# Patient Record
Sex: FEMALE | Race: OTHER
[De-identification: names, ages, dates, MRNs, and addresses within clinical notes are randomized per-mention and may not be internally consistent; named-entity substitution may affect disease eponyms.]

---

## 2017-03-06 ENCOUNTER — RECORD ABSTRACTING (OUTPATIENT)
Age: 62
End: 2017-03-06

## 2017-03-06 DIAGNOSIS — M99.81 OTHER BIOMECHANICAL LESIONS OF CERVICAL REGION: ICD-10-CM

## 2017-03-06 DIAGNOSIS — M48.00 SPINAL STENOSIS, SITE UNSPECIFIED: ICD-10-CM

## 2024-04-17 ENCOUNTER — INPATIENT (INPATIENT)
Facility: HOSPITAL | Age: 69
LOS: 1 days | Discharge: HOME | DRG: 313 | End: 2024-04-19
Attending: INTERNAL MEDICINE | Admitting: STUDENT IN AN ORGANIZED HEALTH CARE EDUCATION/TRAINING PROGRAM
Payer: SELF-PAY

## 2024-04-17 VITALS
SYSTOLIC BLOOD PRESSURE: 131 MMHG | OXYGEN SATURATION: 98 % | WEIGHT: 136.69 LBS | TEMPERATURE: 98 F | DIASTOLIC BLOOD PRESSURE: 68 MMHG | RESPIRATION RATE: 17 BRPM | HEART RATE: 81 BPM

## 2024-04-17 LAB
ALBUMIN SERPL ELPH-MCNC: 4 G/DL — SIGNIFICANT CHANGE UP (ref 3.5–5.2)
ALP SERPL-CCNC: 83 U/L — SIGNIFICANT CHANGE UP (ref 30–115)
ALT FLD-CCNC: 15 U/L — SIGNIFICANT CHANGE UP (ref 0–41)
ANION GAP SERPL CALC-SCNC: 8 MMOL/L — SIGNIFICANT CHANGE UP (ref 7–14)
APTT BLD: 29.8 SEC — SIGNIFICANT CHANGE UP (ref 27–39.2)
AST SERPL-CCNC: 18 U/L — SIGNIFICANT CHANGE UP (ref 0–41)
BASOPHILS # BLD AUTO: 0.06 K/UL — SIGNIFICANT CHANGE UP (ref 0–0.2)
BASOPHILS NFR BLD AUTO: 0.7 % — SIGNIFICANT CHANGE UP (ref 0–1)
BILIRUB SERPL-MCNC: <0.2 MG/DL — SIGNIFICANT CHANGE UP (ref 0.2–1.2)
BUN SERPL-MCNC: 12 MG/DL — SIGNIFICANT CHANGE UP (ref 10–20)
CALCIUM SERPL-MCNC: 8.9 MG/DL — SIGNIFICANT CHANGE UP (ref 8.4–10.5)
CHLORIDE SERPL-SCNC: 110 MMOL/L — SIGNIFICANT CHANGE UP (ref 98–110)
CO2 SERPL-SCNC: 25 MMOL/L — SIGNIFICANT CHANGE UP (ref 17–32)
CREAT SERPL-MCNC: 0.9 MG/DL — SIGNIFICANT CHANGE UP (ref 0.7–1.5)
D DIMER BLD IA.RAPID-MCNC: <150 NG/ML DDU — SIGNIFICANT CHANGE UP
EGFR: 70 ML/MIN/1.73M2 — SIGNIFICANT CHANGE UP
EOSINOPHIL # BLD AUTO: 0.34 K/UL — SIGNIFICANT CHANGE UP (ref 0–0.7)
EOSINOPHIL NFR BLD AUTO: 4.1 % — SIGNIFICANT CHANGE UP (ref 0–8)
GLUCOSE SERPL-MCNC: 87 MG/DL — SIGNIFICANT CHANGE UP (ref 70–99)
HCT VFR BLD CALC: 38 % — SIGNIFICANT CHANGE UP (ref 37–47)
HGB BLD-MCNC: 13.3 G/DL — SIGNIFICANT CHANGE UP (ref 12–16)
IMM GRANULOCYTES NFR BLD AUTO: 0.2 % — SIGNIFICANT CHANGE UP (ref 0.1–0.3)
INR BLD: 0.97 RATIO — SIGNIFICANT CHANGE UP (ref 0.65–1.3)
LYMPHOCYTES # BLD AUTO: 3.91 K/UL — HIGH (ref 1.2–3.4)
LYMPHOCYTES # BLD AUTO: 47.3 % — SIGNIFICANT CHANGE UP (ref 20.5–51.1)
MAGNESIUM SERPL-MCNC: 2.1 MG/DL — SIGNIFICANT CHANGE UP (ref 1.8–2.4)
MCHC RBC-ENTMCNC: 31.4 PG — HIGH (ref 27–31)
MCHC RBC-ENTMCNC: 35 G/DL — SIGNIFICANT CHANGE UP (ref 32–37)
MCV RBC AUTO: 89.6 FL — SIGNIFICANT CHANGE UP (ref 81–99)
MONOCYTES # BLD AUTO: 0.65 K/UL — HIGH (ref 0.1–0.6)
MONOCYTES NFR BLD AUTO: 7.9 % — SIGNIFICANT CHANGE UP (ref 1.7–9.3)
NEUTROPHILS # BLD AUTO: 3.28 K/UL — SIGNIFICANT CHANGE UP (ref 1.4–6.5)
NEUTROPHILS NFR BLD AUTO: 39.8 % — LOW (ref 42.2–75.2)
NRBC # BLD: 0 /100 WBCS — SIGNIFICANT CHANGE UP (ref 0–0)
NT-PROBNP SERPL-SCNC: 166 PG/ML — SIGNIFICANT CHANGE UP (ref 0–300)
PLATELET # BLD AUTO: 297 K/UL — SIGNIFICANT CHANGE UP (ref 130–400)
PMV BLD: 10.2 FL — SIGNIFICANT CHANGE UP (ref 7.4–10.4)
POTASSIUM SERPL-MCNC: 3.9 MMOL/L — SIGNIFICANT CHANGE UP (ref 3.5–5)
POTASSIUM SERPL-SCNC: 3.9 MMOL/L — SIGNIFICANT CHANGE UP (ref 3.5–5)
PROT SERPL-MCNC: 6.6 G/DL — SIGNIFICANT CHANGE UP (ref 6–8)
PROTHROM AB SERPL-ACNC: 11 SEC — SIGNIFICANT CHANGE UP (ref 9.95–12.87)
RBC # BLD: 4.24 M/UL — SIGNIFICANT CHANGE UP (ref 4.2–5.4)
RBC # FLD: 12.8 % — SIGNIFICANT CHANGE UP (ref 11.5–14.5)
SODIUM SERPL-SCNC: 143 MMOL/L — SIGNIFICANT CHANGE UP (ref 135–146)
TROPONIN T, HIGH SENSITIVITY RESULT: 17 NG/L — HIGH (ref 6–13)
TROPONIN T, HIGH SENSITIVITY RESULT: 7 NG/L — SIGNIFICANT CHANGE UP (ref 6–13)
WBC # BLD: 8.26 K/UL — SIGNIFICANT CHANGE UP (ref 4.8–10.8)
WBC # FLD AUTO: 8.26 K/UL — SIGNIFICANT CHANGE UP (ref 4.8–10.8)

## 2024-04-17 PROCEDURE — 71045 X-RAY EXAM CHEST 1 VIEW: CPT | Mod: 26

## 2024-04-17 PROCEDURE — 99285 EMERGENCY DEPT VISIT HI MDM: CPT

## 2024-04-17 PROCEDURE — 70450 CT HEAD/BRAIN W/O DYE: CPT | Mod: 26,MC

## 2024-04-17 RX ORDER — ASPIRIN/CALCIUM CARB/MAGNESIUM 324 MG
324 TABLET ORAL ONCE
Refills: 0 | Status: COMPLETED | OUTPATIENT
Start: 2024-04-17 | End: 2024-04-17

## 2024-04-17 NOTE — ED ADULT TRIAGE NOTE - CHIEF COMPLAINT QUOTE
Pt had syncopal episode today. does not recall event. complaining of chest pain and dizziness prior. EKG done in triage. hx of cardiac stents

## 2024-04-17 NOTE — ED PROVIDER NOTE - CONSIDERATION OF ADMISSION OBSERVATION
Initial plan was for EDOU for cardiac monitoring, stress test in a.m.  However, repeat troponin came back elevated at 17, patient admitted to telemetry for further cardiac evaluation. Consideration of Admission/Observation

## 2024-04-17 NOTE — ED PROVIDER NOTE - OBJECTIVE STATEMENT
68 year old female with a history of HTN, CAD with stents, HLD presents to the ED with syncope. Patient reports intermittent left sided chest pain associated with dry cough and dyspnea on exertion for the past 3 months. Today she had a syncopal episode while in her kitchen witnessed by her daughter in which she became lightheaded, lowered herself to the ground and lost consciousness for 1 minute. Patients Daughter states no head trauma or other injury and patient is not on AC. Denies fever, chills, abdominal pain, nausea, vomiting, diarrhea, constipation, dysuria, hematuria, lower extremity swelling, rash. 68 year old female with a history of HTN, CAD with 2 stents, HLD, ASD presents to the ED with syncope. Patient reports intermittent left sided chest pain associated with dry cough and dyspnea on exertion for the past 3 months. Today she had a syncopal episode while in her kitchen witnessed by her daughter in which she became lightheaded, lowered herself to the ground and lost consciousness for 1 minute. Patients Daughter states no head trauma or other injury and patient is not on AC. Denies fever, chills, abdominal pain, nausea, vomiting, diarrhea, constipation, dysuria, hematuria, lower extremity swelling, rash.

## 2024-04-17 NOTE — ED PROVIDER NOTE - PHYSICAL EXAMINATION
Physical Exam    Constitutional: No acute distress.   Eyes: Conjunctiva pink, Sclera clear, PERRLA, EOMI.  ENT: No sinus tenderness. No nasal discharge. No oropharyngeal erythema, edema, or exudates. Uvula midline.   Cardiovascular: Regular rate, regular rhythm. +murmur  Respiratory: unlabored respiratory effort, clear to auscultation bilaterally no wheezing, rales or rhonchi  Gastrointestinal: Normal bowel sounds. soft, non distended, non-tender abdomen.   Musculoskeletal: supple neck, no midline tenderness. No joint or bony deformity.   Integumentary: warm, dry, no rash  Neurologic: awake, alert, cranial nerves II-XII grossly intact, extremities’ motor and sensory functions grossly intact

## 2024-04-17 NOTE — ED PROVIDER NOTE - CLINICAL SUMMARY MEDICAL DECISION MAKING FREE TEXT BOX
68-year-old female with PMHx as noted, in ER for evaluation after episode of syncope today.  Patient has also been having intermittent L sided CP for the past few months.  Labs reviewed: CBC/CMP.  Unremarkable.  Initial troponin 7.  CT head negative.  CXR negative.  EKG: NSR @76, NAD, no acute ST-T wave changes, QTc 436.  Initial plan was for EDOU for cardiac monitoring, stress test in a.m.  However, repeat troponin came back elevated at 17, patient admitted to telemetry for further cardiac evaluation.

## 2024-04-17 NOTE — ED CDU PROVIDER INITIAL DAY NOTE - OBJECTIVE STATEMENT
68 year old female with a history of HTN, CAD with stents, HLD presents to the ED with syncope. Patient reports intermittent left sided chest pain associated with dry cough and dyspnea on exertion for the past 3 months. Today she had a syncopal episode while in her kitchen witnessed by her daughter in which she became lightheaded, lowered herself to the ground and lost consciousness for 1 minute. Patients Daughter states no head trauma or other injury and patient is not on AC. Denies fever, chills, abdominal pain, nausea, vomiting, diarrhea, constipation, dysuria, hematuria, lower extremity swelling, rash.

## 2024-04-17 NOTE — ED PROVIDER NOTE - ATTENDING APP SHARED VISIT CONTRIBUTION OF CARE
68-year-old female with history of HTN, HLD, CAD s/p stents x 2 in 2015, in ER with daughter for evaluation of CP and episode of syncope today.  Per daughter, patient was in kitchen and felt lightheaded and weak, lowered herself to the ground.  Unsure if she lost consciousness.  Denies any head trauma or headache.  Patient has been having L sided CP intermittently, also having some SOB today.  No LE pain/swelling.  No abdominal pain.  No N/V/D.  Not on any AC meds.  Patient has not followed with cardiologist since having stents placed.  PE - nad, nc/at, eomi, perrl, op - clear, mmm, no C-spine tenderness, cta b/l, no w/r/r, rrr, abd- soft, nt/nd, nabs, from x 4, no LE swelling/tenderness, A&O x 3, cn 2-12 intact, no focal motor/sensory deficits.   -Cardiac workup

## 2024-04-18 ENCOUNTER — RESULT REVIEW (OUTPATIENT)
Age: 69
End: 2024-04-18

## 2024-04-18 DIAGNOSIS — R07.9 CHEST PAIN, UNSPECIFIED: ICD-10-CM

## 2024-04-18 LAB
A1C WITH ESTIMATED AVERAGE GLUCOSE RESULT: 5.5 % — SIGNIFICANT CHANGE UP (ref 4–5.6)
ALBUMIN SERPL ELPH-MCNC: 3.9 G/DL — SIGNIFICANT CHANGE UP (ref 3.5–5.2)
ALP SERPL-CCNC: 87 U/L — SIGNIFICANT CHANGE UP (ref 30–115)
ALT FLD-CCNC: 14 U/L — SIGNIFICANT CHANGE UP (ref 0–41)
ANION GAP SERPL CALC-SCNC: 10 MMOL/L — SIGNIFICANT CHANGE UP (ref 7–14)
AST SERPL-CCNC: 17 U/L — SIGNIFICANT CHANGE UP (ref 0–41)
BASOPHILS # BLD AUTO: 0.07 K/UL — SIGNIFICANT CHANGE UP (ref 0–0.2)
BASOPHILS NFR BLD AUTO: 1 % — SIGNIFICANT CHANGE UP (ref 0–1)
BILIRUB SERPL-MCNC: 0.2 MG/DL — SIGNIFICANT CHANGE UP (ref 0.2–1.2)
BUN SERPL-MCNC: 14 MG/DL — SIGNIFICANT CHANGE UP (ref 10–20)
CALCIUM SERPL-MCNC: 9.3 MG/DL — SIGNIFICANT CHANGE UP (ref 8.4–10.5)
CHLORIDE SERPL-SCNC: 110 MMOL/L — SIGNIFICANT CHANGE UP (ref 98–110)
CHOLEST SERPL-MCNC: 225 MG/DL — HIGH
CO2 SERPL-SCNC: 26 MMOL/L — SIGNIFICANT CHANGE UP (ref 17–32)
CREAT SERPL-MCNC: 0.9 MG/DL — SIGNIFICANT CHANGE UP (ref 0.7–1.5)
EGFR: 70 ML/MIN/1.73M2 — SIGNIFICANT CHANGE UP
EOSINOPHIL # BLD AUTO: 0.25 K/UL — SIGNIFICANT CHANGE UP (ref 0–0.7)
EOSINOPHIL NFR BLD AUTO: 3.7 % — SIGNIFICANT CHANGE UP (ref 0–8)
ESTIMATED AVERAGE GLUCOSE: 111 MG/DL — SIGNIFICANT CHANGE UP (ref 68–114)
GLUCOSE SERPL-MCNC: 112 MG/DL — HIGH (ref 70–99)
HCT VFR BLD CALC: 38.8 % — SIGNIFICANT CHANGE UP (ref 37–47)
HDLC SERPL-MCNC: 50 MG/DL — LOW
HGB BLD-MCNC: 12.9 G/DL — SIGNIFICANT CHANGE UP (ref 12–16)
IMM GRANULOCYTES NFR BLD AUTO: 0.3 % — SIGNIFICANT CHANGE UP (ref 0.1–0.3)
LIPID PNL WITH DIRECT LDL SERPL: 157 MG/DL — HIGH
LYMPHOCYTES # BLD AUTO: 3.14 K/UL — SIGNIFICANT CHANGE UP (ref 1.2–3.4)
LYMPHOCYTES # BLD AUTO: 45.9 % — SIGNIFICANT CHANGE UP (ref 20.5–51.1)
MAGNESIUM SERPL-MCNC: 2.2 MG/DL — SIGNIFICANT CHANGE UP (ref 1.8–2.4)
MCHC RBC-ENTMCNC: 30.4 PG — SIGNIFICANT CHANGE UP (ref 27–31)
MCHC RBC-ENTMCNC: 33.2 G/DL — SIGNIFICANT CHANGE UP (ref 32–37)
MCV RBC AUTO: 91.5 FL — SIGNIFICANT CHANGE UP (ref 81–99)
MONOCYTES # BLD AUTO: 0.51 K/UL — SIGNIFICANT CHANGE UP (ref 0.1–0.6)
MONOCYTES NFR BLD AUTO: 7.5 % — SIGNIFICANT CHANGE UP (ref 1.7–9.3)
NEUTROPHILS # BLD AUTO: 2.85 K/UL — SIGNIFICANT CHANGE UP (ref 1.4–6.5)
NEUTROPHILS NFR BLD AUTO: 41.6 % — LOW (ref 42.2–75.2)
NON HDL CHOLESTEROL: 175 MG/DL — HIGH
NRBC # BLD: 0 /100 WBCS — SIGNIFICANT CHANGE UP (ref 0–0)
PLATELET # BLD AUTO: 276 K/UL — SIGNIFICANT CHANGE UP (ref 130–400)
PMV BLD: 10.2 FL — SIGNIFICANT CHANGE UP (ref 7.4–10.4)
POTASSIUM SERPL-MCNC: 4.4 MMOL/L — SIGNIFICANT CHANGE UP (ref 3.5–5)
POTASSIUM SERPL-SCNC: 4.4 MMOL/L — SIGNIFICANT CHANGE UP (ref 3.5–5)
PROT SERPL-MCNC: 6.7 G/DL — SIGNIFICANT CHANGE UP (ref 6–8)
RBC # BLD: 4.24 M/UL — SIGNIFICANT CHANGE UP (ref 4.2–5.4)
RBC # FLD: 13 % — SIGNIFICANT CHANGE UP (ref 11.5–14.5)
SODIUM SERPL-SCNC: 146 MMOL/L — SIGNIFICANT CHANGE UP (ref 135–146)
TRIGL SERPL-MCNC: 86 MG/DL — SIGNIFICANT CHANGE UP
TROPONIN T, HIGH SENSITIVITY RESULT: <6 NG/L — SIGNIFICANT CHANGE UP (ref 6–13)
TSH SERPL-MCNC: 1.73 UIU/ML — SIGNIFICANT CHANGE UP (ref 0.27–4.2)
WBC # BLD: 6.84 K/UL — SIGNIFICANT CHANGE UP (ref 4.8–10.8)
WBC # FLD AUTO: 6.84 K/UL — SIGNIFICANT CHANGE UP (ref 4.8–10.8)

## 2024-04-18 PROCEDURE — 93010 ELECTROCARDIOGRAM REPORT: CPT | Mod: 77

## 2024-04-18 PROCEDURE — 93005 ELECTROCARDIOGRAM TRACING: CPT

## 2024-04-18 PROCEDURE — 36415 COLL VENOUS BLD VENIPUNCTURE: CPT

## 2024-04-18 PROCEDURE — 93306 TTE W/DOPPLER COMPLETE: CPT | Mod: 26

## 2024-04-18 PROCEDURE — 93458 L HRT ARTERY/VENTRICLE ANGIO: CPT | Mod: 59

## 2024-04-18 PROCEDURE — 99222 1ST HOSP IP/OBS MODERATE 55: CPT | Mod: 57

## 2024-04-18 PROCEDURE — 99254 IP/OBS CNSLTJ NEW/EST MOD 60: CPT

## 2024-04-18 PROCEDURE — 80061 LIPID PANEL: CPT

## 2024-04-18 PROCEDURE — C9600: CPT | Mod: LC

## 2024-04-18 PROCEDURE — 92928 PRQ TCAT PLMT NTRAC ST 1 LES: CPT | Mod: LC

## 2024-04-18 PROCEDURE — 99222 1ST HOSP IP/OBS MODERATE 55: CPT

## 2024-04-18 PROCEDURE — C1725: CPT

## 2024-04-18 PROCEDURE — 93010 ELECTROCARDIOGRAM REPORT: CPT

## 2024-04-18 PROCEDURE — 84100 ASSAY OF PHOSPHORUS: CPT

## 2024-04-18 PROCEDURE — 83735 ASSAY OF MAGNESIUM: CPT

## 2024-04-18 PROCEDURE — 83036 HEMOGLOBIN GLYCOSYLATED A1C: CPT

## 2024-04-18 PROCEDURE — 86803 HEPATITIS C AB TEST: CPT

## 2024-04-18 PROCEDURE — 93458 L HRT ARTERY/VENTRICLE ANGIO: CPT | Mod: 26,XU

## 2024-04-18 PROCEDURE — 84443 ASSAY THYROID STIM HORMONE: CPT

## 2024-04-18 PROCEDURE — C1874: CPT

## 2024-04-18 PROCEDURE — C1769: CPT

## 2024-04-18 PROCEDURE — C1894: CPT

## 2024-04-18 PROCEDURE — 84484 ASSAY OF TROPONIN QUANT: CPT

## 2024-04-18 PROCEDURE — C1887: CPT

## 2024-04-18 PROCEDURE — 80053 COMPREHEN METABOLIC PANEL: CPT

## 2024-04-18 PROCEDURE — 93306 TTE W/DOPPLER COMPLETE: CPT

## 2024-04-18 PROCEDURE — 85025 COMPLETE CBC W/AUTO DIFF WBC: CPT

## 2024-04-18 RX ORDER — CLOPIDOGREL BISULFATE 75 MG/1
75 TABLET, FILM COATED ORAL DAILY
Refills: 0 | Status: DISCONTINUED | OUTPATIENT
Start: 2024-04-18 | End: 2024-04-19

## 2024-04-18 RX ORDER — ONDANSETRON 8 MG/1
4 TABLET, FILM COATED ORAL EVERY 8 HOURS
Refills: 0 | Status: DISCONTINUED | OUTPATIENT
Start: 2024-04-18 | End: 2024-04-19

## 2024-04-18 RX ORDER — SODIUM CHLORIDE 9 MG/ML
250 INJECTION INTRAMUSCULAR; INTRAVENOUS; SUBCUTANEOUS ONCE
Refills: 0 | Status: COMPLETED | OUTPATIENT
Start: 2024-04-18 | End: 2024-04-18

## 2024-04-18 RX ORDER — INFLUENZA VIRUS VACCINE 15; 15; 15; 15 UG/.5ML; UG/.5ML; UG/.5ML; UG/.5ML
0.7 SUSPENSION INTRAMUSCULAR ONCE
Refills: 0 | Status: DISCONTINUED | OUTPATIENT
Start: 2024-04-18 | End: 2024-04-19

## 2024-04-18 RX ORDER — ASPIRIN/CALCIUM CARB/MAGNESIUM 324 MG
81 TABLET ORAL DAILY
Refills: 0 | Status: DISCONTINUED | OUTPATIENT
Start: 2024-04-19 | End: 2024-04-19

## 2024-04-18 RX ORDER — PANTOPRAZOLE SODIUM 20 MG/1
40 TABLET, DELAYED RELEASE ORAL
Refills: 0 | Status: DISCONTINUED | OUTPATIENT
Start: 2024-04-18 | End: 2024-04-19

## 2024-04-18 RX ORDER — AMLODIPINE BESYLATE 2.5 MG/1
2.5 TABLET ORAL ONCE
Refills: 0 | Status: COMPLETED | OUTPATIENT
Start: 2024-04-18 | End: 2024-04-18

## 2024-04-18 RX ORDER — ENOXAPARIN SODIUM 100 MG/ML
40 INJECTION SUBCUTANEOUS EVERY 24 HOURS
Refills: 0 | Status: DISCONTINUED | OUTPATIENT
Start: 2024-04-18 | End: 2024-04-19

## 2024-04-18 RX ORDER — ATORVASTATIN CALCIUM 80 MG/1
80 TABLET, FILM COATED ORAL AT BEDTIME
Refills: 0 | Status: DISCONTINUED | OUTPATIENT
Start: 2024-04-18 | End: 2024-04-19

## 2024-04-18 RX ORDER — METOPROLOL TARTRATE 50 MG
25 TABLET ORAL
Refills: 0 | Status: DISCONTINUED | OUTPATIENT
Start: 2024-04-18 | End: 2024-04-19

## 2024-04-18 RX ORDER — LANOLIN ALCOHOL/MO/W.PET/CERES
3 CREAM (GRAM) TOPICAL AT BEDTIME
Refills: 0 | Status: DISCONTINUED | OUTPATIENT
Start: 2024-04-18 | End: 2024-04-19

## 2024-04-18 RX ORDER — ACETAMINOPHEN 500 MG
650 TABLET ORAL EVERY 6 HOURS
Refills: 0 | Status: DISCONTINUED | OUTPATIENT
Start: 2024-04-18 | End: 2024-04-19

## 2024-04-18 RX ORDER — SODIUM CHLORIDE 9 MG/ML
1000 INJECTION, SOLUTION INTRAVENOUS
Refills: 0 | Status: DISCONTINUED | OUTPATIENT
Start: 2024-04-18 | End: 2024-04-18

## 2024-04-18 RX ORDER — SODIUM CHLORIDE 9 MG/ML
1000 INJECTION INTRAMUSCULAR; INTRAVENOUS; SUBCUTANEOUS
Refills: 0 | Status: DISCONTINUED | OUTPATIENT
Start: 2024-04-18 | End: 2024-04-19

## 2024-04-18 RX ADMIN — CLOPIDOGREL BISULFATE 75 MILLIGRAM(S): 75 TABLET, FILM COATED ORAL at 08:22

## 2024-04-18 RX ADMIN — SODIUM CHLORIDE 100 MILLILITER(S): 9 INJECTION INTRAMUSCULAR; INTRAVENOUS; SUBCUTANEOUS at 11:39

## 2024-04-18 RX ADMIN — Medication 25 MILLIGRAM(S): at 18:30

## 2024-04-18 RX ADMIN — SODIUM CHLORIDE 250 MILLILITER(S): 9 INJECTION INTRAMUSCULAR; INTRAVENOUS; SUBCUTANEOUS at 08:53

## 2024-04-18 RX ADMIN — Medication 3 MILLIGRAM(S): at 22:00

## 2024-04-18 RX ADMIN — ATORVASTATIN CALCIUM 80 MILLIGRAM(S): 80 TABLET, FILM COATED ORAL at 22:00

## 2024-04-18 RX ADMIN — AMLODIPINE BESYLATE 2.5 MILLIGRAM(S): 2.5 TABLET ORAL at 08:55

## 2024-04-18 RX ADMIN — Medication 324 MILLIGRAM(S): at 00:40

## 2024-04-18 RX ADMIN — ENOXAPARIN SODIUM 40 MILLIGRAM(S): 100 INJECTION SUBCUTANEOUS at 22:01

## 2024-04-18 NOTE — PATIENT PROFILE ADULT - LANGUAGE ASSISTANCE NEEDED
-- Message is from the [facility]--    Reason for Call: Patient returning a call from the office.    Caller Information       Type Contact Phone    04/27/2019 10:32 AM Phone (Incoming) Teetee Laurent (Self) [phone] (H)          Alternative phone number: na    Turnaround time given to caller:   \"This message will be sent to [state Provider's name]. The clinical team will fulfill your request as soon as they review your message.\"     Yes-Patient/Caregiver accepts free interpretation services...

## 2024-04-18 NOTE — H&P ADULT - NSHPLABSRESULTS_GEN_ALL_CORE
12.9   6.84  )-----------( 276      ( 18 Apr 2024 07:22 )             38.8       04-18    146  |  110  |  14  ----------------------------<  112<H>  4.4   |  26  |  0.9    Ca    9.3      18 Apr 2024 07:22  Mg     2.2     04-18    TPro  6.7  /  Alb  3.9  /  TBili  0.2  /  DBili  x   /  AST  17  /  ALT  14  /  AlkPhos  87  04-18              Urinalysis Basic - ( 18 Apr 2024 07:22 )    Color: x / Appearance: x / SG: x / pH: x  Gluc: 112 mg/dL / Ketone: x  / Bili: x / Urobili: x   Blood: x / Protein: x / Nitrite: x   Leuk Esterase: x / RBC: x / WBC x   Sq Epi: x / Non Sq Epi: x / Bacteria: x        PT/INR - ( 17 Apr 2024 18:30 )   PT: 11.00 sec;   INR: 0.97 ratio         PTT - ( 17 Apr 2024 18:30 )  PTT:29.8 sec    Lactate Trend            CAPILLARY BLOOD GLUCOSE

## 2024-04-18 NOTE — CONSULT NOTE ADULT - SUBJECTIVE AND OBJECTIVE BOX
Date of Admission: 04-18-24    HISTORY OF PRESENT ILLNESS:    67 YO F with HTN, CAD s/p PCI to LAD in 2016, HLD, ASD s/p repair presented to the ED with syncope.   She reports that she felt dizzy and passed out. Denies any head trauma. Family brought her to the ED. She denies any chest pain or palpitations before passing out.   She also reports experiencing squeezing chest pressure with radiation to the neck. She her limited her physical activity. Chest pressure happens at rest and with exertion. Pain is not reproducible.   Denies fever, chills, abdominal pain, nausea, vomiting, diarrhea, constipation, dysuria, hematuria, lower extremity swelling, rash.  In the ED VS were WNL. Labs were significant for troponin 7 -> 17. No acute ischemic changes on EKG      PAST MEDICAL & SURGICAL HISTORY  Essential hypertension    Hyperlipemia    Peptic ulcer disease    ASD (atrial septal defect)    No significant past surgical history        FAMILY HISTORY:  Father: MI  Mother: Stroke  Sister: CABG at 52    SOCIAL HISTORY:   [X] Non-smoker    REVIEW OF SYMPTOMS:  CONSTITUTIONAL: No fevers or chills.  EYES: No visual changes, eye pain, or discharge  ENT: No vertigo; No ear pain or change in hearing; No sore throat or difficulty swallowing  NECK: No pain or stiffness  RESPIRATORY: Shortness of breath  CARDIOVASCULAR: Chest pain  GASTROINTESTINAL: No abdominal or epigastric pain; No nausea, vomiting, or hematemesis; No diarrhea or constipation; No melena or hematochezia  GENITOURINARY: No dysuria, frequency or hematuria  MUSCULOSKELETAL: No joint pain, no muscle pain  NEUROLOGICAL: No numbness or weakness  SKIN: No itching or rashes    VITALS:  T(C): 36.6 (04-17-24 @ 23:59), Max: 36.6 (04-17-24 @ 15:30)  HR: 70 (04-17-24 @ 23:59) (70 - 81)  BP: 109/58 (04-17-24 @ 23:59) (109/58 - 131/68)  RR: 17 (04-17-24 @ 23:59) (17 - 17)  SpO2: 99% (04-17-24 @ 23:59) (98% - 99%)  Wt(kg): --  Daily     Daily     PHYSICAL EXAM:  GEN: Not in acute distress  HEENT: EOMI  LUNGS: Dec BS   CARDIOVASCULAR: RRR, S1/S2 present  ABD: Soft, non-tender, non-distended  EXT: No FRANCISCO  SKIN: Warm  NEURO: AAOx3    LABS:	 	                        13.3   8.26  )-----------( 297      ( 17 Apr 2024 16:40 )             38.0     04-17    143  |  110  |  12  ----------------------------<  87  3.9   |  25  |  0.9    Ca    8.9      17 Apr 2024 16:40  Mg     2.1     04-17    TPro  6.6  /  Alb  4.0  /  TBili  <0.2  /  DBili  x   /  AST  18  /  ALT  15  /  AlkPhos  83  04-17        PT/INR - ( 17 Apr 2024 18:30 )   PT: 11.00 sec;   INR: 0.97 ratio         PTT - ( 17 Apr 2024 18:30 )  PTT:29.8 sec    Intake and Output:      CARDIAC MARKERS:  Troponin T, High Sensitivity Result: 17 ng/L (04-17-24 @ 20:41)  Troponin T, High Sensitivity Result: 7 ng/L (04-17-24 @ 16:40)      TELEMETRY EVENTS:    ECG:    RADIOLOGY:    OTHER:    Echocardiogram:    Catheterization:    Stress Test: 	    Home Medications:  acetaminophen 325 mg oral tablet: 2 tab(s) orally every 6 hours, As needed, Mild Pain (22 Dec 2015 15:54)  aspirin 81 mg oral delayed release tablet: 1 tab(s) orally once a day (22 Dec 2015 12:07)  clopidogrel 75 mg oral tablet: 1 tab(s) orally once a day (22 Dec 2015 12:07)  metoprolol: 12.5   every 12 hours (22 Dec 2015 12:07)  simvastatin 20 mg oral tablet: 1 tab(s) orally once a day (at bedtime) (22 Dec 2015 12:07)    MEDICATIONS  (STANDING):    MEDICATIONS  (PRN):         Date of Admission: 04-18-24      HISTORY OF PRESENT ILLNESS:      67 YO F with HTN, CAD s/p PCI to LAD in 2016, HLD, ASD s/p repair presented to the ED with syncope.   She reports that she felt dizzy and passed out. Denies any head trauma. Family brought her to the ED. She denies any chest pain or palpitations before passing out.   She also reports experiencing squeezing chest pressure with radiation to the neck. She her limited her physical activity. Chest pressure happens at rest and with exertion. Pain is not reproducible.   Denies fever, chills, abdominal pain, nausea, vomiting, diarrhea, constipation, dysuria, hematuria, lower extremity swelling, rash.  In the ED VS were WNL. Labs were significant for troponin 7 -> 17. No acute ischemic changes on EKG      PAST MEDICAL & SURGICAL HISTORY  Essential hypertension    Hyperlipemia    Peptic ulcer disease    ASD (atrial septal defect)    No significant past surgical history        FAMILY HISTORY:  Father: MI  Mother: Stroke  Sister: CABG at 52    SOCIAL HISTORY:   [X] Non-smoker    REVIEW OF SYMPTOMS:  CONSTITUTIONAL: No fevers or chills.  EYES: No visual changes, eye pain, or discharge  ENT: No vertigo; No ear pain or change in hearing; No sore throat or difficulty swallowing  NECK: No pain or stiffness  RESPIRATORY: Shortness of breath  CARDIOVASCULAR: Chest pain  GASTROINTESTINAL: No abdominal or epigastric pain; No nausea, vomiting, or hematemesis; No diarrhea or constipation; No melena or hematochezia  GENITOURINARY: No dysuria, frequency or hematuria  MUSCULOSKELETAL: No joint pain, no muscle pain  NEUROLOGICAL: No numbness or weakness  SKIN: No itching or rashes    VITALS:  T(C): 36.6 (04-17-24 @ 23:59), Max: 36.6 (04-17-24 @ 15:30)  HR: 70 (04-17-24 @ 23:59) (70 - 81)  BP: 109/58 (04-17-24 @ 23:59) (109/58 - 131/68)  RR: 17 (04-17-24 @ 23:59) (17 - 17)  SpO2: 99% (04-17-24 @ 23:59) (98% - 99%)  Wt(kg): --  Daily     Daily     PHYSICAL EXAM:  GEN: Not in acute distress  HEENT: EOMI  LUNGS: Dec BS   CARDIOVASCULAR: RRR, S1/S2 present  ABD: Soft, non-tender, non-distended  EXT: No FRANCISCO  SKIN: Warm  NEURO: AAOx3    LABS:	 	                        13.3   8.26  )-----------( 297      ( 17 Apr 2024 16:40 )             38.0     04-17    143  |  110  |  12  ----------------------------<  87  3.9   |  25  |  0.9    Ca    8.9      17 Apr 2024 16:40  Mg     2.1     04-17    TPro  6.6  /  Alb  4.0  /  TBili  <0.2  /  DBili  x   /  AST  18  /  ALT  15  /  AlkPhos  83  04-17        PT/INR - ( 17 Apr 2024 18:30 )   PT: 11.00 sec;   INR: 0.97 ratio         PTT - ( 17 Apr 2024 18:30 )  PTT:29.8 sec    Intake and Output:      CARDIAC MARKERS:  Troponin T, High Sensitivity Result: 17 ng/L (04-17-24 @ 20:41)  Troponin T, High Sensitivity Result: 7 ng/L (04-17-24 @ 16:40)      TELEMETRY EVENTS:    ECG:    RADIOLOGY:    OTHER:    Echocardiogram:    Catheterization:    Stress Test: 	    Home Medications:  acetaminophen 325 mg oral tablet: 2 tab(s) orally every 6 hours, As needed, Mild Pain (22 Dec 2015 15:54)  aspirin 81 mg oral delayed release tablet: 1 tab(s) orally once a day (22 Dec 2015 12:07)  clopidogrel 75 mg oral tablet: 1 tab(s) orally once a day (22 Dec 2015 12:07)  metoprolol: 12.5   every 12 hours (22 Dec 2015 12:07)  simvastatin 20 mg oral tablet: 1 tab(s) orally once a day (at bedtime) (22 Dec 2015 12:07)    MEDICATIONS  (STANDING):    MEDICATIONS  (PRN):

## 2024-04-18 NOTE — PATIENT PROFILE ADULT - FALL HARM RISK - HARM RISK INTERVENTIONS

## 2024-04-18 NOTE — CONSULT NOTE ADULT - ATTENDING COMMENTS
Patient well knonw to me form priro admissions and outpatient f/u. History of 2 vessel CAD, s/p PCI of proximal LAD with WERO in 2016, medical therapy for LCX, s/p ASD closure with Amplatzer device in [place]. Presenting with chest discomfort, syncope.    Plan:    C/w ASA/Plavix, statin  Troponin trending up, delta >5  Options for ischemic w/u discussed. Will proceed with angiogram for definitive diagnosis  IV hydration  Keep on tele to r/o arrhythmia  2D echo to assess the LV function, placement of the IAS device.  Further recommendations post cath

## 2024-04-18 NOTE — H&P ADULT - NSHPPHYSICALEXAM_GEN_ALL_CORE
LOS:     VITALS:   T(C): 36.2 (04-18-24 @ 08:20), Max: 36.6 (04-17-24 @ 15:30)  HR: 73 (04-18-24 @ 08:20) (67 - 81)  BP: 133/68 (04-18-24 @ 08:20) (109/58 - 133/68)  RR: 16 (04-18-24 @ 08:20) (16 - 18)  SpO2: 100% (04-18-24 @ 08:20) (98% - 100%)    GENERAL: NAD, lying in bed comfortably  NECK: Supple, No JVD  CHEST/LUNG: Clear to auscultation bilaterally; No rales, rhonchi, wheezing, or rubs. Unlabored respirations  HEART: Regular rate and rhythm; No murmurs, rubs, or gallops  ABDOMEN: BSx4; Soft, nontender, nondistended  EXTREMITIES:  2+ Peripheral Pulses, brisk capillary refill. No clubbing, cyanosis, or edema  NERVOUS SYSTEM:  A&Ox3, no focal deficits   SKIN: No rashes or lesions

## 2024-04-18 NOTE — CONSULT NOTE ADULT - SUBJECTIVE AND OBJECTIVE BOX
Date of Admission: 04-18-24    CARDIAC HISTORY OF PRESENT ILLNESS:    67 YO F with HTN, CAD (s/p 2 stents by Dr. Nieto in 2015), HLD, ASD (as per the patient s/p repair) presented to the ED with syncope.   She reports that she felt dizzy and passed out. Denies any head trauma. Family brought her to the ED. She denies any chest pain or palpitations before passing out.   She also reports experiencing squeezing chest pressure with radiation to the neck. She her limited her physical activity. Chest pressure happens at rest and with exertion. Pain is not reproducible.   Denies fever, chills, abdominal pain, nausea, vomiting, diarrhea, constipation, dysuria, hematuria, lower extremity swelling, rash.  In the ED VS were WNL. Labs were significant for troponin 7 -> 17. No acute ischemic changes on EKG    PAST MEDICAL & SURGICAL HISTORY  Essential hypertension    Hyperlipemia    Peptic ulcer disease    ASD (atrial septal defect)    No significant past surgical history        FAMILY HISTORY:  Father: MI  Mother: Stroke  Sister: CABG at 52      SOCIAL HISTORY:   [X] Non-smoker    REVIEW OF SYMPTOMS:  CONSTITUTIONAL: No fevers or chills.  EYES: No visual changes, eye pain, or discharge  ENT: No vertigo; No ear pain or change in hearing; No sore throat or difficulty swallowing  NECK: No pain or stiffness  RESPIRATORY: Shortness of breath  CARDIOVASCULAR: Chest pain  GASTROINTESTINAL: No abdominal or epigastric pain; No nausea, vomiting, or hematemesis; No diarrhea or constipation; No melena or hematochezia  GENITOURINARY: No dysuria, frequency or hematuria  MUSCULOSKELETAL: No joint pain, no muscle pain  NEUROLOGICAL: No numbness or weakness  SKIN: No itching or rashes    VITALS:  T(C): 36.6 (04-17-24 @ 23:59), Max: 36.6 (04-17-24 @ 15:30)  HR: 70 (04-17-24 @ 23:59) (70 - 81)  BP: 109/58 (04-17-24 @ 23:59) (109/58 - 131/68)  RR: 17 (04-17-24 @ 23:59) (17 - 17)  SpO2: 99% (04-17-24 @ 23:59) (98% - 99%)  Wt(kg): --  Daily     Daily     PHYSICAL EXAM:  GEN: Not in acute distress  HEENT: EOMI  LUNGS: Dec BS   CARDIOVASCULAR: RRR, S1/S2 present  ABD: Soft, non-tender, non-distended  EXT: No FRANCISCO  SKIN: Warm  NEURO: AAOx3    LABS:	 	                        13.3   8.26  )-----------( 297      ( 17 Apr 2024 16:40 )             38.0     04-17    143  |  110  |  12  ----------------------------<  87  3.9   |  25  |  0.9    Ca    8.9      17 Apr 2024 16:40  Mg     2.1     04-17    TPro  6.6  /  Alb  4.0  /  TBili  <0.2  /  DBili  x   /  AST  18  /  ALT  15  /  AlkPhos  83  04-17        PT/INR - ( 17 Apr 2024 18:30 )   PT: 11.00 sec;   INR: 0.97 ratio         PTT - ( 17 Apr 2024 18:30 )  PTT:29.8 sec    Intake and Output:      CARDIAC MARKERS:  Troponin T, High Sensitivity Result: 17 ng/L (04-17-24 @ 20:41)  Troponin T, High Sensitivity Result: 7 ng/L (04-17-24 @ 16:40)      TELEMETRY EVENTS:    ECG:    RADIOLOGY:    OTHER:    Echocardiogram:    Catheterization:    Stress Test: 	    Home Medications:  acetaminophen 325 mg oral tablet: 2 tab(s) orally every 6 hours, As needed, Mild Pain (22 Dec 2015 15:54)  aspirin 81 mg oral delayed release tablet: 1 tab(s) orally once a day (22 Dec 2015 12:07)  clopidogrel 75 mg oral tablet: 1 tab(s) orally once a day (22 Dec 2015 12:07)  metoprolol: 12.5   every 12 hours (22 Dec 2015 12:07)  simvastatin 20 mg oral tablet: 1 tab(s) orally once a day (at bedtime) (22 Dec 2015 12:07)    MEDICATIONS  (STANDING):    MEDICATIONS  (PRN):         Date of Admission: 04-18-24    CARDIAC HISTORY OF PRESENT ILLNESS:    69 YO F with HTN, CAD s/p PCI to LAD in 2016, HLD, ASD s/p repair presented to the ED with syncope.   She reports that she felt dizzy and passed out. Denies any head trauma. Family brought her to the ED. She denies any chest pain or palpitations before passing out.   She also reports experiencing squeezing chest pressure with radiation to the neck. She her limited her physical activity. Chest pressure happens at rest and with exertion. Pain is not reproducible.   Denies fever, chills, abdominal pain, nausea, vomiting, diarrhea, constipation, dysuria, hematuria, lower extremity swelling, rash.  In the ED VS were WNL. Labs were significant for troponin 7 -> 17. No acute ischemic changes on EKG    PAST MEDICAL & SURGICAL HISTORY  Essential hypertension    Hyperlipemia    Peptic ulcer disease    ASD (atrial septal defect)    No significant past surgical history        FAMILY HISTORY:  Father: MI  Mother: Stroke  Sister: CABG at 52      SOCIAL HISTORY:   [X] Non-smoker    REVIEW OF SYMPTOMS:  CONSTITUTIONAL: No fevers or chills.  EYES: No visual changes, eye pain, or discharge  ENT: No vertigo; No ear pain or change in hearing; No sore throat or difficulty swallowing  NECK: No pain or stiffness  RESPIRATORY: Shortness of breath  CARDIOVASCULAR: Chest pain  GASTROINTESTINAL: No abdominal or epigastric pain; No nausea, vomiting, or hematemesis; No diarrhea or constipation; No melena or hematochezia  GENITOURINARY: No dysuria, frequency or hematuria  MUSCULOSKELETAL: No joint pain, no muscle pain  NEUROLOGICAL: No numbness or weakness  SKIN: No itching or rashes    VITALS:  T(C): 36.6 (04-17-24 @ 23:59), Max: 36.6 (04-17-24 @ 15:30)  HR: 70 (04-17-24 @ 23:59) (70 - 81)  BP: 109/58 (04-17-24 @ 23:59) (109/58 - 131/68)  RR: 17 (04-17-24 @ 23:59) (17 - 17)  SpO2: 99% (04-17-24 @ 23:59) (98% - 99%)  Wt(kg): --  Daily     Daily     PHYSICAL EXAM:  GEN: Not in acute distress  HEENT: EOMI  LUNGS: Dec BS   CARDIOVASCULAR: RRR, S1/S2 present  ABD: Soft, non-tender, non-distended  EXT: No FRANCISCO  SKIN: Warm  NEURO: AAOx3    LABS:	 	                        13.3   8.26  )-----------( 297      ( 17 Apr 2024 16:40 )             38.0     04-17    143  |  110  |  12  ----------------------------<  87  3.9   |  25  |  0.9    Ca    8.9      17 Apr 2024 16:40  Mg     2.1     04-17    TPro  6.6  /  Alb  4.0  /  TBili  <0.2  /  DBili  x   /  AST  18  /  ALT  15  /  AlkPhos  83  04-17        PT/INR - ( 17 Apr 2024 18:30 )   PT: 11.00 sec;   INR: 0.97 ratio         PTT - ( 17 Apr 2024 18:30 )  PTT:29.8 sec    Intake and Output:      CARDIAC MARKERS:  Troponin T, High Sensitivity Result: 17 ng/L (04-17-24 @ 20:41)  Troponin T, High Sensitivity Result: 7 ng/L (04-17-24 @ 16:40)      TELEMETRY EVENTS:    ECG:    RADIOLOGY:    OTHER:    Echocardiogram:    Catheterization:    Stress Test: 	    Home Medications:  acetaminophen 325 mg oral tablet: 2 tab(s) orally every 6 hours, As needed, Mild Pain (22 Dec 2015 15:54)  aspirin 81 mg oral delayed release tablet: 1 tab(s) orally once a day (22 Dec 2015 12:07)  clopidogrel 75 mg oral tablet: 1 tab(s) orally once a day (22 Dec 2015 12:07)  metoprolol: 12.5   every 12 hours (22 Dec 2015 12:07)  simvastatin 20 mg oral tablet: 1 tab(s) orally once a day (at bedtime) (22 Dec 2015 12:07)    MEDICATIONS  (STANDING):    MEDICATIONS  (PRN):         Date of Admission: 04-18-24    CARDIAC HISTORY OF PRESENT ILLNESS:    67 YO F with HTN, CAD s/p PCI to LAD in 2016, HLD, ASD s/p repair presented to the ED with syncope.   She reports that she felt dizzy and passed out. Denies any head trauma. Family brought her to the ED. She denies any chest pain or palpitations before passing out.   She also reports experiencing squeezing chest pressure with radiation to the neck. She her limited her physical activity. Chest pressure happens at rest and with exertion. Pain is not reproducible.   Denies fever, chills, abdominal pain, nausea, vomiting, diarrhea, constipation, dysuria, hematuria, lower extremity swelling, rash.  In the ED VS were WNL. Labs were significant for troponin 7 -> 17. No acute ischemic changes on EKG    PAST MEDICAL & SURGICAL HISTORY  Essential hypertension    Hyperlipemia    Peptic ulcer disease    ASD (atrial septal defect)    No significant past surgical history        FAMILY HISTORY:  Father: MI  Mother: Stroke  Sister: CABG at 52      SOCIAL HISTORY:   [X] Non-smoker    REVIEW OF SYMPTOMS:  CONSTITUTIONAL: No fevers or chills.  EYES: No visual changes, eye pain, or discharge  ENT: No vertigo; No ear pain or change in hearing; No sore throat or difficulty swallowing  NECK: No pain or stiffness  RESPIRATORY: Shortness of breath  CARDIOVASCULAR: Chest pain  GASTROINTESTINAL: No abdominal or epigastric pain; No nausea, vomiting, or hematemesis; No diarrhea or constipation; No melena or hematochezia  GENITOURINARY: No dysuria, frequency or hematuria  MUSCULOSKELETAL: No joint pain, no muscle pain  NEUROLOGICAL: No numbness or weakness  SKIN: No itching or rashes  10 point ROS completed; negative except as stated in HPI    VITALS:  T(C): 36.6 (04-17-24 @ 23:59), Max: 36.6 (04-17-24 @ 15:30)  HR: 70 (04-17-24 @ 23:59) (70 - 81)  BP: 109/58 (04-17-24 @ 23:59) (109/58 - 131/68)  RR: 17 (04-17-24 @ 23:59) (17 - 17)  SpO2: 99% (04-17-24 @ 23:59) (98% - 99%)  Wt(kg): --  Daily     Daily     PHYSICAL EXAM:  GEN: Not in acute distress, pleasant  HEENT: EOMI, PERRLA  LUNGS: CTAB, no crackles  CARDIOVASCULAR: RRR, S1/S2 present  ABD: Soft, non-tender, non-distended  EXT: No FRANCISCO, warm  SKIN: intact, no rash  NEURO: AAOx3, no focal deficits    LABS:	 	                        13.3   8.26  )-----------( 297      ( 17 Apr 2024 16:40 )             38.0     04-17    143  |  110  |  12  ----------------------------<  87  3.9   |  25  |  0.9    Ca    8.9      17 Apr 2024 16:40  Mg     2.1     04-17    TPro  6.6  /  Alb  4.0  /  TBili  <0.2  /  DBili  x   /  AST  18  /  ALT  15  /  AlkPhos  83  04-17        PT/INR - ( 17 Apr 2024 18:30 )   PT: 11.00 sec;   INR: 0.97 ratio         PTT - ( 17 Apr 2024 18:30 )  PTT:29.8 sec    Intake and Output:      CARDIAC MARKERS:  Troponin T, High Sensitivity Result: 17 ng/L (04-17-24 @ 20:41)  Troponin T, High Sensitivity Result: 7 ng/L (04-17-24 @ 16:40)      TELEMETRY EVENTS:    ECG:    RADIOLOGY:    OTHER:    Echocardiogram:    Catheterization:    Stress Test: 	    Home Medications:  acetaminophen 325 mg oral tablet: 2 tab(s) orally every 6 hours, As needed, Mild Pain (22 Dec 2015 15:54)  aspirin 81 mg oral delayed release tablet: 1 tab(s) orally once a day (22 Dec 2015 12:07)  clopidogrel 75 mg oral tablet: 1 tab(s) orally once a day (22 Dec 2015 12:07)  metoprolol: 12.5   every 12 hours (22 Dec 2015 12:07)  simvastatin 20 mg oral tablet: 1 tab(s) orally once a day (at bedtime) (22 Dec 2015 12:07)    MEDICATIONS  (STANDING):    MEDICATIONS  (PRN):

## 2024-04-18 NOTE — CHART NOTE - NSCHARTNOTEFT_GEN_A_CORE
PREOPERATIVE DAY OF PROCEDURE EVALUATION:  I have personally seen and examined the patient.  I agree with the history and physical which I have reviewed and noted any changes below.     69 YO [place]iss[place] speaking F with HTN, HLD, CAD, s/p PCI/WERO pLAD in 05/2016, ASD s/p repair, who presented to the ED with syncopal episode.  Pt also reports worsening chest burning sensation both on exertion and at rest, trop 7->17.  Pt is now referred for [facility] with possible intervention if clinically indicated.       Bleeding Risk Score:    1.3%  IVF pre-hydration: 250cc NS bolus   -on daily baby ASA and Plavix, received both this am  -on BB, statin    Right Matthew: positive  VS: 133/68, 73, 16, 98% on RA       (Signed electronically by __________)  04-18-24 @ 08:41 PREOPERATIVE DAY OF PROCEDURE EVALUATION:  I have personally seen and examined the patient.  I agree with the history and physical which I have reviewed and noted any changes below.     67 YO Chinese speaking F with HTN, HLD, CAD, s/p PCI/WERO pLAD in 05/2016, ASD s/p repair, who presented to the ED with syncopal episode.  Pt also reports worsening chest burning sensation, both on exertion and at rest, trop 7->17.  Pt is now referred for [facility] [place] with possible intervention if clinically indicated.       Bleeding Risk Score:    1.3%  IVF pre-hydration: 250cc NS bolus   -on daily baby ASA and Plavix, received both this am  -on BB, statin  -Amlodipine 2.5mg po x 1    Right Matthew: positive  VS: 133/68, 73, 16, 98% on RA       (Signed electronically by __________)  04-18-24 @ 08:41

## 2024-04-18 NOTE — CONSULT NOTE ADULT - ASSESSMENT
67 YO F with HTN, CAD (s/p 2 stents by Dr. Nieto in 2015), HLD, ASD (as per the patient s/p repair) presented to the ED with syncope. Also reports worsening chest pressure.    IMPRESSION  #Chest pain; likely cardiac  #CAD s/p PCI  - HS Troponin trend: 7 (17 Apr 2024 16:40), 17 (17 Apr 2024 20:41)  - No acute ischemic changes on EKG  - Takes plavix 75mg PO daily and rosuvastatin   #Syncope; likely vasovagal; cannot rule out cardiac etiology  - CT head -ve  #HTN    PLAN  - Serial cardiac enzymes and EKGs  - Continue plavix 75mg PO daily  - Continue high intensity statin  - Lipid profile, HbA1C, TSH  - Keep NPO. Will schedule for cath today. 69 YO F with HTN, CAD (pLAD in 2015), HLD, ASD (as per the patient s/p repair) presented to the ED with syncope. Also reports worsening chest pressure.    IMPRESSION  #Chest pain; likely cardiac  #CAD s/p PCI  - HS Troponin trend: 7 (17 Apr 2024 16:40), 17 (17 Apr 2024 20:41)  - No acute ischemic changes on EKG  - Takes plavix 75mg PO daily and rosuvastatin   #Syncope; likely vasovagal; cannot rule out cardiac etiology  - CT head -ve  #HTN    PLAN  - Serial cardiac enzymes and EKGs  - Continue plavix 75mg PO daily  - Continue high intensity statin  - Lipid profile, HbA1C, TSH  - Keep NPO. Will schedule for cath today. 67 YO F with HTN, CAD (pLAD in 2015), HLD, ASD s/p repair presented to the ED with syncope. Also reports worsening chest pressure.    IMPRESSION  #Chest pain; likely cardiac  #CAD s/p PCI  - HS Troponin trend: 7 (17 Apr 2024 16:40), 17 (17 Apr 2024 20:41)  - No acute ischemic changes on EKG  - Takes plavix 75mg PO daily and rosuvastatin   #Syncope; likely vasovagal; cannot rule out cardiac etiology  - CT head -ve  #HTN    PLAN  - Serial cardiac enzymes and EKGs  - Continue plavix 75mg PO daily  - Continue high intensity statin  - Lipid profile, HbA1C, TSH  - 2D echo  - Keep NPO. Will schedule for cath today.

## 2024-04-18 NOTE — PHARMACOTHERAPY INTERVENTION NOTE - COMMENTS
enoxaparin 40mg sc q24h, post-procedure, d/w Dr Ortega, start enoxaparin tonight, adjust administration time to 2200.

## 2024-04-18 NOTE — CHART NOTE - NSCHARTNOTEFT_GEN_A_CORE
PRE-OP DIAGNOSIS:    UA    PROCEDURE:     [x] Coronary Angiogram     [x] LHC     [] LVG     [] RHC     [x] Intervention (see below)         PHYSICIAN: Dr. Nieto     ASSISTANT:  Dr. Abrams       PROCEDURE DESCRIPTION:     Consent:      [x] Patient     [] Family Member     []  Used        Anesthesia:     [] General     [x] Sedation     [x] Local        Access & Closure:     [x] 6 Fr Right Radial Artery, D stat     [] Fr Femoral Artery     [] Fr Femoral Vein     [] Fr Brachial Vein       IV Contrast: 100mL        Intervention: PCI to OM1      Implants: Synergy 3.5x20mm proximal, 3.0x32mm       FINDINGS:     Coronary Dominance: Right    LM: no disease    LAD: mild ISR  D1: mild disease    CX: mild disease  Om1: 80% stenosis    RCA: mild disease  RPDA: mild disease     LVEDP: 6mmHg     ESTIMATED BLOOD LOSS: < 10 mL        CONDITION:     [x] Good     [] Fair     [] Critical        SPECIMEN REMOVED: N/A       POST-OP DIAGNOSIS:      [] Normal Coronary Angiogram     [] Mild Coronary Artery Disease (< 50% stenosis)     [x] 1Vessel Coronary Artery Disease        PLAN OF CARE:     [x] return to inpatient     [x] Optimize medical therapy    [x] IV hydration 100cc/h PRE-OP DIAGNOSIS:    UA    PROCEDURE:     [x] Coronary Angiogram     [x] LHC     [] LVG     [] RHC     [x] Intervention (see below)         PHYSICIAN: Dr. Nieto     ASSISTANT:  Dr. Abrams       PROCEDURE DESCRIPTION:     Consent:      [x] Patient     [] Family Member     []  Used        Anesthesia:     [] General     [x] Sedation     [x] Local        Access & Closure:     [x] 6 Fr Right Radial Artery, D stat     [] Fr Femoral Artery     [] Fr Femoral Vein     [] Fr Brachial Vein       IV Contrast: 100mL        Intervention: PCI to OM1      Implants: Synergy 3.5x20mm proximal, 3.0x32mm distal       FINDINGS:     Coronary Dominance: Right    LM: no disease    LAD: mild ISR  D1: mild disease    CX: mild disease  Om1: 80% stenosis    RCA: mild disease  RPDA: mild disease     LVEDP: 6mmHg     ESTIMATED BLOOD LOSS: < 10 mL        CONDITION:     [x] Good     [] Fair     [] Critical        SPECIMEN REMOVED: N/A       POST-OP DIAGNOSIS:          [x] 1-Vessel Coronary Artery Disease        PLAN OF CARE:     [x] return to inpatient bed    [x] Optimize medical therapy, C/w DAPT    [x] IV hydration 100cc/h

## 2024-04-18 NOTE — H&P ADULT - HISTORY OF PRESENT ILLNESS
67 YO F with HTN, CAD s/p PCI to LAD in 2016, HLD, ASD s/p repair presented to the ED with syncope.   She reports that she felt dizzy and passed out. Denies any head trauma. Family brought her to the ED. She denies any chest pain or palpitations before passing out.   She also reports experiencing squeezing chest pressure with radiation to the neck. She her limited her physical activity. Chest pressure happens at rest and with exertion. Pain is not reproducible.   Denies fever, chills, abdominal pain, nausea, vomiting, diarrhea, constipation, dysuria, hematuria, lower extremity swelling, rash.    ROS   Positive as per HPI   Negative fever, chills, nausea, vomiting, chest pain, abdominal pain, sob, cough, constipation, diarrhea, incontinence    ED Vital Signs Last 24 Hrs  T(C): 36.2 (18 Apr 2024 08:20), Max: 36.6 (17 Apr 2024 15:30)  T(F): 97.2 (18 Apr 2024 08:20), Max: 97.9 (17 Apr 2024 15:30)  HR: 73 (18 Apr 2024 08:20) (67 - 81)  BP: 133/68 (18 Apr 2024 08:20) (109/58 - 133/68)  RR: 16 (18 Apr 2024 08:20) (16 - 18)  SpO2: 100% (18 Apr 2024 08:20) (98% - 100%)  O2 Parameters below as of 18 Apr 2024 07:35  Patient On (Oxygen Delivery Method): room air    Labs significant for trop 6-> 17 -> <6   EKG no ASIYA - TWI   CXR low lung volumes  CTH neg   In the ED given aspirin, plavix, amlodipine, IVF 1L

## 2024-04-18 NOTE — CONSULT NOTE ADULT - ASSESSMENT
67 YO F with HTN, CAD (pLAD in 2015), HLD, ASD s/p repair presented to the ED with syncope. Also reports worsening chest pressure.    IMPRESSION  #Chest pain; likely cardiac  #CAD s/p PCI  - HS Troponin trend: 7 (17 Apr 2024 16:40), 17 (17 Apr 2024 20:41)  - No acute ischemic changes on EKG  - Takes plavix 75mg PO daily and rosuvastatin   #Syncope; likely vasovagal; cannot rule out cardiac etiology  - CT head -ve  #HTN    PLAN  - Serial cardiac enzymes and EKGs  - Continue plavix 75mg PO daily and high intensity statin  - Lipid profile, HbA1C, TSH  - 2D echo  - Keep NPO. Will schedule for cath today.    Further recommendations to follow after cardiac catheterization 69 YO F with HTN, CAD (pLAD in 2015), HLD, ASD s/p repair presented to the ED with syncope. Also reports worsening chest pressure.    IMPRESSION:    #Chest pain; likely cardiac  #CAD s/p PCI  - HS Troponin trend: 7 (17 Apr 2024 16:40), 17 (17 Apr 2024 20:41)  - No acute ischemic changes on EKG  - Takes plavix 75mg PO daily and rosuvastatin   #Syncope; likely vasovagal; cannot rule out cardiac etiology  - CT head -ve  #HTN    PLAN  - Serial cardiac enzymes and EKGs  - Continue plavix 75mg PO daily and high intensity statin  - Lipid profile, HbA1C, TSH  - 2D echo  - Keep NPO. Will schedule for cath today.    Further recommendations to follow after cardiac catheterization

## 2024-04-18 NOTE — CONSULT NOTE ADULT - ATTENDING COMMENTS
Briefly, 68 year old woman for whom cardiology is consulted for elevated troponin, in the setting of syncope and chest pain. Patient's delta change for troponin is significant and with her symptoms and history of prior PCI, we will recommend cardiac catheterization. Continue aspirin, plavix and statin.     The procedure was discussed in detail with the patient, including the risks, benefits and alternatives. The patient verbalized understanding and wishes to proceed.     Discussed with Dr. Nieto -> cath today. Briefly, 68 year old woman for whom cardiology is consulted for elevated troponin, in the setting of syncope and chest pain. Patient's delta change for troponin is significant and with her symptoms and history of prior PCI, we will recommend cardiac catheterization. Continue aspirin, plavix and statin.     The procedure was discussed in detail with the patient, including the risks, benefits and alternatives. The patient verbalized understanding and wishes to proceed.     Discussed with Dr. Nieto -> cath today.    Please get an echocardiogram. [place]

## 2024-04-18 NOTE — H&P ADULT - ATTENDING COMMENTS
My note supersedes all residents notes that I sign, My correction for their notes are in my notes   Pt evalutaed in the CATH lab recovery area   [place]  # [id number]. the patient stated that she wants to sleep as she just finished the procedure and she doesn't want to talk for now  , but she indicated that she came for "heartburn"   On exam  General: awake, alert, NAD, chronic ill appearance  Lungs:  clear to ausculations b/l, normal resp effort  Heart: regular ryhthm   Abdomen: soft, non tender non distended  Ext: no edema, can move all  his extremities , Rt hand radial compression device in place, no hematoma noted , no pain       69 YO F with HTN, CAD s/p PCI to LAD in 2016, HLD, ASD s/p repair presented to the ED with syncope.     Active Issues    []chest pain  likely cardiac   [] syncope   []Hx of CAD s/p PCI     s/p [facility] today - PCI to OM1   - DAPT and statin   - TTE eval   - Telemetry   - metoprolol 25 BID  - atorvastatin 80  trop trend 7 ---> 17 ( delta >5) but trended down to 7  f/u with cardiology   monitor R radial site for hematoma or hand ischemia       []ASD s/p repair  []HTN  []HLD    resume home meds   - c/w home meds  - control BP     #DVT - lovenox sq     #GI - pantoprazole

## 2024-04-18 NOTE — H&P ADULT - ASSESSMENT
69 YO F with HTN, CAD s/p PCI to LAD in 2016, HLD, ASD s/p repair presented to the ED with syncope.     Active Issues    #Cardiac chest pain   #Troponemia with NSTEMI   #Hx of CAD s/p PCI   - cardiac cath evaluation  - DAPT for now  - TTE eval   - Telemetry   - metoprolol     Chronic Issues     #HTN  #HLD    - c/w home meds  - control BP   - atorvastatin 40     #DVT - lovenox sq     #GI - pantoprazole     #Diet - DASH    #Activity - IAT     #Code - Full code     #Disposition - IP Telemetry  67 YO F with HTN, CAD s/p PCI to LAD in 2016, HLD, ASD s/p repair presented to the ED with syncope.     Active Issues    #Cardiac chest pain   #Troponemia with NSTEMI   #Hx of CAD s/p PCI   - cardiac cath evaluation  - DAPT for now  - TTE eval   - Telemetry   - metoprolol 25 BID  - atorvastatin 80    Chronic Issues     #ASD s/p repair  #HTN  #HLD    - c/w home meds  - control BP     #DVT - lovenox sq     #GI - pantoprazole     #Diet - DASH    #Activity - IAT     #Code - Full code     #Disposition - IP Telemetry

## 2024-04-19 ENCOUNTER — NON-APPOINTMENT (OUTPATIENT)
Age: 69
End: 2024-04-19

## 2024-04-19 ENCOUNTER — TRANSCRIPTION ENCOUNTER (OUTPATIENT)
Age: 69
End: 2024-04-19

## 2024-04-19 ENCOUNTER — APPOINTMENT (OUTPATIENT)
Dept: CARDIOLOGY | Facility: CLINIC | Age: 69
End: 2024-04-19

## 2024-04-19 VITALS
TEMPERATURE: 97 F | HEART RATE: 75 BPM | DIASTOLIC BLOOD PRESSURE: 57 MMHG | RESPIRATION RATE: 18 BRPM | SYSTOLIC BLOOD PRESSURE: 123 MMHG

## 2024-04-19 LAB
ALBUMIN SERPL ELPH-MCNC: 3.7 G/DL — SIGNIFICANT CHANGE UP (ref 3.5–5.2)
ALP SERPL-CCNC: 90 U/L — SIGNIFICANT CHANGE UP (ref 30–115)
ALT FLD-CCNC: 17 U/L — SIGNIFICANT CHANGE UP (ref 0–41)
ANION GAP SERPL CALC-SCNC: 15 MMOL/L — HIGH (ref 7–14)
AST SERPL-CCNC: 24 U/L — SIGNIFICANT CHANGE UP (ref 0–41)
BASOPHILS # BLD AUTO: 0.07 K/UL — SIGNIFICANT CHANGE UP (ref 0–0.2)
BASOPHILS NFR BLD AUTO: 0.9 % — SIGNIFICANT CHANGE UP (ref 0–1)
BILIRUB SERPL-MCNC: 0.3 MG/DL — SIGNIFICANT CHANGE UP (ref 0.2–1.2)
BUN SERPL-MCNC: 11 MG/DL — SIGNIFICANT CHANGE UP (ref 10–20)
CALCIUM SERPL-MCNC: 9 MG/DL — SIGNIFICANT CHANGE UP (ref 8.4–10.4)
CHLORIDE SERPL-SCNC: 108 MMOL/L — SIGNIFICANT CHANGE UP (ref 98–110)
CO2 SERPL-SCNC: 19 MMOL/L — SIGNIFICANT CHANGE UP (ref 17–32)
CREAT SERPL-MCNC: 0.7 MG/DL — SIGNIFICANT CHANGE UP (ref 0.7–1.5)
EGFR: 95 ML/MIN/1.73M2 — SIGNIFICANT CHANGE UP
EOSINOPHIL # BLD AUTO: 0.32 K/UL — SIGNIFICANT CHANGE UP (ref 0–0.7)
EOSINOPHIL NFR BLD AUTO: 4.2 % — SIGNIFICANT CHANGE UP (ref 0–8)
GLUCOSE SERPL-MCNC: 121 MG/DL — HIGH (ref 70–99)
HCT VFR BLD CALC: 39.5 % — SIGNIFICANT CHANGE UP (ref 37–47)
HCV AB S/CO SERPL IA: 0.06 COI — SIGNIFICANT CHANGE UP
HCV AB SERPL-IMP: SIGNIFICANT CHANGE UP
HGB BLD-MCNC: 13.3 G/DL — SIGNIFICANT CHANGE UP (ref 12–16)
IMM GRANULOCYTES NFR BLD AUTO: 0.3 % — SIGNIFICANT CHANGE UP (ref 0.1–0.3)
LYMPHOCYTES # BLD AUTO: 2.31 K/UL — SIGNIFICANT CHANGE UP (ref 1.2–3.4)
LYMPHOCYTES # BLD AUTO: 30.2 % — SIGNIFICANT CHANGE UP (ref 20.5–51.1)
MAGNESIUM SERPL-MCNC: 2.1 MG/DL — SIGNIFICANT CHANGE UP (ref 1.8–2.4)
MCHC RBC-ENTMCNC: 30.7 PG — SIGNIFICANT CHANGE UP (ref 27–31)
MCHC RBC-ENTMCNC: 33.7 G/DL — SIGNIFICANT CHANGE UP (ref 32–37)
MCV RBC AUTO: 91.2 FL — SIGNIFICANT CHANGE UP (ref 81–99)
MONOCYTES # BLD AUTO: 0.63 K/UL — HIGH (ref 0.1–0.6)
MONOCYTES NFR BLD AUTO: 8.2 % — SIGNIFICANT CHANGE UP (ref 1.7–9.3)
NEUTROPHILS # BLD AUTO: 4.31 K/UL — SIGNIFICANT CHANGE UP (ref 1.4–6.5)
NEUTROPHILS NFR BLD AUTO: 56.2 % — SIGNIFICANT CHANGE UP (ref 42.2–75.2)
NRBC # BLD: 0 /100 WBCS — SIGNIFICANT CHANGE UP (ref 0–0)
PHOSPHATE SERPL-MCNC: 3.7 MG/DL — SIGNIFICANT CHANGE UP (ref 2.1–4.9)
PLATELET # BLD AUTO: 257 K/UL — SIGNIFICANT CHANGE UP (ref 130–400)
PMV BLD: 10.2 FL — SIGNIFICANT CHANGE UP (ref 7.4–10.4)
POTASSIUM SERPL-MCNC: 4.3 MMOL/L — SIGNIFICANT CHANGE UP (ref 3.5–5)
POTASSIUM SERPL-SCNC: 4.3 MMOL/L — SIGNIFICANT CHANGE UP (ref 3.5–5)
PROT SERPL-MCNC: 6.4 G/DL — SIGNIFICANT CHANGE UP (ref 6–8)
RBC # BLD: 4.33 M/UL — SIGNIFICANT CHANGE UP (ref 4.2–5.4)
RBC # FLD: 13.2 % — SIGNIFICANT CHANGE UP (ref 11.5–14.5)
SODIUM SERPL-SCNC: 142 MMOL/L — SIGNIFICANT CHANGE UP (ref 135–146)
WBC # BLD: 7.66 K/UL — SIGNIFICANT CHANGE UP (ref 4.8–10.8)
WBC # FLD AUTO: 7.66 K/UL — SIGNIFICANT CHANGE UP (ref 4.8–10.8)

## 2024-04-19 PROCEDURE — 99232 SBSQ HOSP IP/OBS MODERATE 35: CPT

## 2024-04-19 PROCEDURE — 99239 HOSP IP/OBS DSCHRG MGMT >30: CPT | Mod: GC

## 2024-04-19 RX ORDER — CLOPIDOGREL BISULFATE 75 MG/1
1 TABLET, FILM COATED ORAL
Qty: 30 | Refills: 0
Start: 2024-04-19 | End: 2024-05-18

## 2024-04-19 RX ORDER — ATORVASTATIN CALCIUM 80 MG/1
1 TABLET, FILM COATED ORAL
Qty: 0 | Refills: 0 | DISCHARGE
Start: 2024-04-19

## 2024-04-19 RX ORDER — CLOPIDOGREL BISULFATE 75 MG/1
1 TABLET, FILM COATED ORAL
Qty: 0 | Refills: 0 | DISCHARGE
Start: 2024-04-19

## 2024-04-19 RX ORDER — ASPIRIN/CALCIUM CARB/MAGNESIUM 324 MG
1 TABLET ORAL
Qty: 0 | Refills: 0 | DISCHARGE
Start: 2024-04-19

## 2024-04-19 RX ORDER — METOPROLOL TARTRATE 50 MG
1 TABLET ORAL
Qty: 60 | Refills: 0
Start: 2024-04-19 | End: 2024-05-18

## 2024-04-19 RX ORDER — ASPIRIN/CALCIUM CARB/MAGNESIUM 324 MG
1 TABLET ORAL
Qty: 30 | Refills: 0
Start: 2024-04-19 | End: 2024-05-18

## 2024-04-19 RX ORDER — ATORVASTATIN CALCIUM 80 MG/1
1 TABLET, FILM COATED ORAL
Qty: 30 | Refills: 0
Start: 2024-04-19 | End: 2024-05-18

## 2024-04-19 RX ORDER — METOPROLOL TARTRATE 50 MG
1 TABLET ORAL
Qty: 0 | Refills: 0 | DISCHARGE
Start: 2024-04-19

## 2024-04-19 RX ADMIN — PANTOPRAZOLE SODIUM 40 MILLIGRAM(S): 20 TABLET, DELAYED RELEASE ORAL at 06:28

## 2024-04-19 RX ADMIN — Medication 25 MILLIGRAM(S): at 06:28

## 2024-04-19 RX ADMIN — Medication 81 MILLIGRAM(S): at 11:12

## 2024-04-19 RX ADMIN — CLOPIDOGREL BISULFATE 75 MILLIGRAM(S): 75 TABLET, FILM COATED ORAL at 11:11

## 2024-04-19 NOTE — DISCHARGE NOTE PROVIDER - NSDCFUSCHEDAPPT_GEN_ALL_CORE_FT
Russel Nieto Physician [facility]  CARDIOLOGY [phone]  Scheduled Appointment: 04/26/2024

## 2024-04-19 NOTE — PROGRESS NOTE ADULT - ASSESSMENT
S/p PCI of LCX for UA  Clinically improved.   - uncontrolled.    Recommend:    C/w ASA/Plavix  Increase Atorvastatin dose to 80 mg (was taking low dose at home) - repeat labs in 6-8 weeks  2D echo as outpatient  C/w BB  D/c home today.  F/u in 2 weeks in the office.  Plan discussed with the patient

## 2024-04-19 NOTE — DISCHARGE NOTE PROVIDER - NSDCCPCAREPLAN_GEN_ALL_CORE_FT
PRINCIPAL DISCHARGE DIAGNOSIS  Diagnosis: Unstable angina  Assessment and Plan of Treatment: Angina happens when there is not enough blood flow to your heart muscle. Angina is a sign of coronary artery disease. Coronary artery disease happens when fatty deposits called plaque (say "plak") build up inside your coronary arteries. This plaque may limit the amount of blood to your heart muscle. Having coronary artery disease also increases your risk of a heart attack.  Call 911 anytime you think you may need emergency care. For example, call if:  You passed out (lost consciousness).  You have symptoms of a heart attack. These may include:  Chest pain or pressure, or a strange feeling in the chest.  Sweating.  Shortness of breath.  Nausea or vomiting.  Pain, pressure, or a strange feeling in the back, neck, jaw, or upper belly or in one or both shoulders or arms.  Light-headedness or sudden weakness.  A fast or irregular heartbeat.  After you call 911, the  may tell you to chew 1 adult-strength or 2 to 4 low-dose aspirin. Wait for an ambulance. Do not try to drive yourself.  Lifestyle changes  Do not smoke. If you need help quitting, talk to your doctor about stop-smoking programs and medicines. These can increase your chances of quitting for good.  Eat a heart-healthy diet that is low in saturated fat and salt.  You underwent cardiac catheterization while in the hospital and were found to have a diseased lateral circumflex artery. Percutaneous coronary Intervention was perfromed with the placement of one stent. It is very important that you continue taking the antiplatelet ("blood-thinner") medications to prevent thrombosis "blood-clot" within the stent. Please also continue taking Atorvastatin and Metoprolol as prescribed. Please follow up with Cardiologist Dr. Russel Nieto in 2 weeks following discharge for follow up and to schedule an echocardiogram to assess your heart function and to schdule labwork.

## 2024-04-19 NOTE — DISCHARGE NOTE PROVIDER - CARE PROVIDER_API CALL
Russel Nieto  Interventional Cardiology  [address] [facility] [address], [address]  [place] [address]  Phone: [phone]  Fax: [phone]  Scheduled Appointment: 04/26/2024    Levar Chavez  [facility]  [address] [place] [address], Admin - [address]  [place] [address]  Phone: [phone]  Fax: [phone]  Follow Up Time: 2 weeks

## 2024-04-19 NOTE — DISCHARGE NOTE NURSING/CASE MANAGEMENT/SOCIAL WORK - MODE OF TRANSPORTATION
47 years old left greater than right shoulder pain for about 3 months time aching pain 3 on good days 7 on bad days pain nighttime lies on that side.  Pain reaching overhead or behind her back    Exam shows cervical motion does not seem to reproduce her symptoms, positive Neer Romero impingement sign, cuff strength weak and painful     X-rays of the shoulder good artificial cervical disc noted     Assessment:  Left greater than right shoulder pain impingement bursitis versus cervical radicular symptoms in this patient with a history of cervical spine disease     Plan:  Kenalog injection left shoulder subacromial space, home exercise program, follow-up as needed     Ambulatory

## 2024-04-19 NOTE — PROGRESS NOTE ADULT - SUBJECTIVE AND OBJECTIVE BOX
Cardiology Follow up s/p PCI    DAVINA CALVILLO   68y Female  PAST MEDICAL & SURGICAL HISTORY:    Essential hypertension    Hyperlipemia    Peptic ulcer disease    ASD (atrial septal defect)    No significant past surgical history           HPI:  69 YO F with HTN, CAD s/p PCI to LAD in 2016, HLD, ASD s/p repair presented to the ED with syncope.   She reports that she felt dizzy and passed out. Denies any head trauma. Family brought her to the ED. She denies any chest pain or palpitations before passing out.   She also reports experiencing squeezing chest pressure with radiation to the neck. She her limited her physical activity. Chest pressure happens at rest and with exertion. Pain is not reproducible.   Denies fever, chills, abdominal pain, nausea, vomiting, diarrhea, constipation, dysuria, hematuria, lower extremity swelling, rash.      Allergies    No Known Allergies    Intolerances    Patient seen and examined at bedside. No acute events overnight.  Patient without complaints. Pt ambulated without issues/symptoms  Denies CP, SOB, palpitations, or dizziness  No events on telemetry overnight    Vital Signs Last 24 Hrs  T(C): 36.2 (19 Apr 2024 04:35), Max: 36.2 (18 Apr 2024 18:29)  T(F): 97.1 (19 Apr 2024 04:35), Max: 97.2 (18 Apr 2024 18:29)  HR: 68 (19 Apr 2024 04:35) (60 - 82)  BP: 111/62 (19 Apr 2024 04:35) (95/60 - 129/78)  BP(mean): --  RR: 18 (19 Apr 2024 04:35) (14 - 18)  SpO2: 100% (18 Apr 2024 18:29) (97% - 100%)    Parameters below as of 18 Apr 2024 18:29  Patient On (Oxygen Delivery Method): room air    MEDICATIONS  (STANDING):  aspirin  chewable 81 milliGRAM(s) Oral daily  atorvastatin 80 milliGRAM(s) Oral at bedtime  clopidogrel Tablet 75 milliGRAM(s) Oral daily  enoxaparin Injectable 40 milliGRAM(s) SubCutaneous every 24 hours  influenza  Vaccine (HIGH DOSE) 0.7 milliLiter(s) IntraMuscular once  metoprolol tartrate 25 milliGRAM(s) Oral two times a day  pantoprazole    Tablet 40 milliGRAM(s) Oral before breakfast  sodium chloride 0.9%. 1000 milliLiter(s) (100 mL/Hr) IV Continuous <Continuous>    MEDICATIONS  (PRN):  acetaminophen     Tablet .. 650 milliGRAM(s) Oral every 6 hours PRN Temp greater or equal to 38C (100.4F), Mild Pain (1 - 3)  aluminum hydroxide/magnesium hydroxide/simethicone Suspension 30 milliLiter(s) Oral every 4 hours PRN Dyspepsia  melatonin 3 milliGRAM(s) Oral at bedtime PRN Insomnia  ondansetron Injectable 4 milliGRAM(s) IV Push every 8 hours PRN Nausea and/or Vomiting      REVIEW OF SYSTEMS:          All negative except as mentioned in HPI    PHYSICAL EXAM:           CONSTITUTIONAL: Well-developed; well-nourished; in no acute distress  	SKIN: warm, dry  	HEAD: Normocephalic; atraumatic  	EYES: PERRL.  	ENT: No nasal discharge, airway clear, mucous membranes moist  	NECK: Supple; non tender.  	CARD: +S1, +S2, no murmurs, gallops, or rubs. Regular rate and rhythm    	RESP: No wheezes, rales or rhonchi. CTA B/L  	ABD: soft ntnd, + BS x 4 quadrants  	EXT: moves all extremities,  no clubbing, cyanosis or edema  	NEURO: Alert and oriented x3, no focal deficits          PSYCH: Cooperative, appropriate          VASCULAR:  + Rad / + PTs / +  DPs          EXTREMITY:              Right Radial: pressure dressing removed, access site soft, no hematoma, ecchymosis noted, no pain, + pulses, no sign of infection, no numbness            ECG:   < from: 12 Lead ECG (04.18.24 @ 00:42) >  Ventricular Rate 71 BPM    Atrial Rate 71 BPM    P-R Interval 170 ms    QRS Duration 86 ms    Q-T Interval 414 ms    QTC Calculation(Bazett) 449 ms    P Axis 66 degrees    R Axis 67 degrees    T Axis 59 degrees    Diagnosis Line Normal sinus rhythm  Low voltage QRS    Abnormal ECG    Confirmed by KATI COLEMAN MD ([id number]) on 4/18/2024 7:02:31 AM                                                                                           2D ECHO:  < from: TTE Echo Complete w/o Contrast w/ Doppler (04.18.24 @ 19:55) >  Summary:   1. Normal global left ventricular systolic function.   2. LV Ejection Fraction by Agrawal's Method with a biplane EF of 68 %.   3. Spectral Doppler shows impaired relaxation pattern of left   ventricular myocardial filling (Grade I diastolic dysfunction).   4. Normal right ventricular size and function.   5. There is noevidence of pericardial effusion.   6. Mild tricuspid regurgitation.      LABS:                        13.3   7.66  )-----------( 257      ( 19 Apr 2024 05:49 )             39.5     04-19    142  |  108  |  11  ----------------------------<  121<H>  4.3   |  19  |  0.7    Ca    9.0      19 Apr 2024 05:49  Phos  3.7     04-19  Mg     2.1     04-19    TPro  6.4  /  Alb  3.7  /  TBili  0.3  /  DBili  x   /  AST  24  /  ALT  17  /  AlkPhos  90  04-19    Magnesium: 2.1 mg/dL [1.8 - 2.4] (04-19-24 @ 05:49)  LIVER FUNCTIONS - ( 19 Apr 2024 05:49 )  Alb: 3.7 g/dL / Pro: 6.4 g/dL / ALK PHOS: 90 U/L / ALT: 17 U/L / AST: 24 U/L / GGT: x           PT/INR - ( 17 Apr 2024 18:30 )   PT: 11.00 sec;   INR: 0.97 ratio         PTT - ( 17 Apr 2024 18:30 )  PTT:29.8 sec  I&O's Summary    18 Apr 2024 07:01  -  19 Apr 2024 07:00  --------------------------------------------------------  IN: 50 mL / OUT: 0 mL / NET: 50 mL      BNP Magnesium: 2.1 mg/dL [1.8 - 2.4] (04-19-24 @ 05:49)    LDL Cholesterol Calculated: 157 mg/dL (04.18.24 @ 07:22)   A1C with Estimated Average Glucose Result: 5.5    A/P:  I discussed the case with Cardiologist Dr. Nieto and recommend the following:  S/P PCI:     Access & Closure:     [x] 6 Fr Right Radial Artery, D stat     IV Contrast: 100mL      Intervention: PCI to OM1    Implants: Synergy 3.5x20mm proximal, 3.0x32mm distal     FINDINGS:     Coronary Dominance: Right    LM: no disease    LAD: mild ISR  D1: mild disease    CX: mild disease  Om1: 80% stenosis    RCA: mild disease  RPDA: mild disease     LVEDP: 6mmHg                                           No ACEi/ARB, EF NL, no DM  	     Continue DAPT ( Aspirin 81 mg PO Daily and Plavix 75 mg Daily ), B-Blocker, Statin Therapy, PPI                   Patient given 30 day supply of ( Aspirin 81 mg daily and Plavix 75 mg daily ) to take at home                   Patient agreeing to take DAPT for at least one year or as directed by cardiologist                    Pt given instructions on importance of taking antiplatelet medication or risk acute stent thrombosis/death                   Post cath instructions, access site care and activity restrictions reviewed with patient                     Cardiac rehab information provided/referral and communication to Cardiac Rehab completed                      Benefits of Cardiac Rehab discussed with patient                   Patient instructed to call Cardiac Rehab and make first appointment after first f/u visit with Cardiologist                    Discussed with patient to return to hospital if experience chest pain, shortness breath, dizziness and site bleeding                   Aggressive risk factor modification, diet counseling, smoking cessation discussed with patient                       Can discharge patient from interventional cardiac standpoint at after ambulating without symptoms and access site wnl, ECG and blood work reviewed                    Follow up with Cardiology Dr. Nieto in two weeks. Patient instructed to call and make an appointment                     Discharge instructions as follows, when ready to d/c:    Activity:  - Do not drive or operate heavy machinery for 24 hours.  - Limit your physical or any strenuous activity for 2 weeks after angioplasty and 48 hours for angiogram. Support the groin site with your hand when you sneeze or cough. No heavy lifting ( objects more then 10 pounds).  - For wrist access, avoid using affected arm for 24 hours after removal of dressing and avoid heavy lifting for 7 days.  Hygiene:  - After 24 hours, you may shower and remove the dressing from the site. Do not tub bathe for one week. Do not rub or apply lotion, cream, powder to the affected site. Leave it open to air.   Diet:   - You may resume your diet. Low Sodium. Low Fat, Low Cholesterol.  If Diabetic - Carbohydrate Consistent Diet.      - Drink extra fluid unless otherwise advised.   Special Instructions:  - Bruising or black and blue at the puncture site is possible.  - If there is bleeding from the puncture site (groin or wrist) apply direct firm pressure on the site and call 911.  - Any sudden swelling, redness, fever, discharge or severe pain, call your physician or call the cath lab.   - If you notice any scab formation in the area avoid touching the site and allow it to heal.  - Numbness or "pins and needle" sensation in the affected arm, hand, leg or if the affected site become cool to touch or pale that persist for extended period of time call your physician immediately to be checked.  - If you developed chest pain, not relieved by your usual routine medication, fainting, lethargy, weakness, report to the nearest emergency room.   - Inform your Dentist or Surgeon if you are taking Aspirin or any antiplatelet medications. Report any bleeding in your urine or stool.   Medications:  - Soreness or tenderness at the site is possible it will diminish over time. You may take Tylenol every 4-6 hours as needed. Nothing stronger is needed.  - If you are diabetic and taking medication containing Metformin, do not take them for 48 hours after the procedure.     Any questions call Cardiac Cath Lab at [phone] or [phone], Monday - Friday from 7 - 9 pm.                                     
Patient is a 68y old  Female who presents with a chief complaint of chest pain (18 Apr 2024 08:22)          SUBJ:  Patient seen and examined. No chest pain. Access site healed well. No hematoma. Mild superficial ecchymosis.        MEDICATIONS  (STANDING):  aspirin  chewable 81 milliGRAM(s) Oral daily  atorvastatin 80 milliGRAM(s) Oral at bedtime  clopidogrel Tablet 75 milliGRAM(s) Oral daily  enoxaparin Injectable 40 milliGRAM(s) SubCutaneous every 24 hours  influenza  Vaccine (HIGH DOSE) 0.7 milliLiter(s) IntraMuscular once  metoprolol tartrate 25 milliGRAM(s) Oral two times a day  pantoprazole    Tablet 40 milliGRAM(s) Oral before breakfast  sodium chloride 0.9%. 1000 milliLiter(s) (100 mL/Hr) IV Continuous <Continuous>    MEDICATIONS  (PRN):  acetaminophen     Tablet .. 650 milliGRAM(s) Oral every 6 hours PRN Temp greater or equal to 38C (100.4F), Mild Pain (1 - 3)  aluminum hydroxide/magnesium hydroxide/simethicone Suspension 30 milliLiter(s) Oral every 4 hours PRN Dyspepsia  melatonin 3 milliGRAM(s) Oral at bedtime PRN Insomnia  ondansetron Injectable 4 milliGRAM(s) IV Push every 8 hours PRN Nausea and/or Vomiting            Vital Signs Last 24 Hrs  T(C): 36.2 (19 Apr 2024 04:35), Max: 36.2 (18 Apr 2024 18:29)  T(F): 97.1 (19 Apr 2024 04:35), Max: 97.2 (18 Apr 2024 18:29)  HR: 68 (19 Apr 2024 04:35) (60 - 82)  BP: 111/62 (19 Apr 2024 04:35) (95/60 - 130/63)  BP(mean): --  RR: 18 (19 Apr 2024 04:35) (14 - 18)  SpO2: 100% (18 Apr 2024 18:29) (97% - 100%)    Parameters below as of 18 Apr 2024 18:29  Patient On (Oxygen Delivery Method): room air          PHYSICAL EXAM:    GEN: AAO x 3, NAD  HEENT: NC/AT, PERRL  Neck: No JVD, no bruits  CV: Reg, S1-S2, no murmur  Lungs: CTAB  Abd: Soft, non-tender  Ext: No edema        04-18-24 @ 07:01  -  04-19-24 @ 07:00  --------------------------------------------------------  IN: 50 mL / OUT: 0 mL / NET: 50 mL        I&O's Summary    18 Apr 2024 07:01  -  19 Apr 2024 07:00  --------------------------------------------------------  IN: 50 mL / OUT: 0 mL / NET: 50 mL    	    TELEMETRY:    ECG:    TTE:      LABS:                        13.3   7.66  )-----------( 257      ( 19 Apr 2024 05:49 )             39.5     04-19    142  |  108  |  11  ----------------------------<  121<H>  4.3   |  19  |  0.7    Ca    9.0      19 Apr 2024 05:49  Phos  3.7     04-19  Mg     2.1     04-19    TPro  6.4  /  Alb  3.7  /  TBili  0.3  /  DBili  x   /  AST  24  /  ALT  17  /  AlkPhos  90  04-19        PT/INR - ( 17 Apr 2024 18:30 )   PT: 11.00 sec;   INR: 0.97 ratio         PTT - ( 17 Apr 2024 18:30 )  PTT:29.8 sec      BNP  RADIOLOGY & ADDITIONAL STUDIES:      IMPRESSION AND PLAN:

## 2024-04-19 NOTE — DISCHARGE NOTE NURSING/CASE MANAGEMENT/SOCIAL WORK - PATIENT PORTAL LINK FT
You can access the FollowMyHealth Patient Portal offered by [facility] by registering at the following website: [email or web address]. By joining [employer]’s FollowMyHealth portal, you will also be able to view your health information using other applications (apps) compatible with our system.

## 2024-04-19 NOTE — DISCHARGE NOTE PROVIDER - CARE PROVIDERS DIRECT ADDRESSES
[email or web address]

## 2024-04-19 NOTE — DISCHARGE NOTE PROVIDER - PROVIDER TOKENS
PROVIDER:[TOKEN:[[id number]],SCHEDULEDAPPT:[04/26/2024]],PROVIDER:[TOKEN:[[id number]],FOLLOWUP:[2 weeks]]

## 2024-04-19 NOTE — DISCHARGE NOTE PROVIDER - HOSPITAL COURSE
Pt is a 68-year-old female with a history of hypertension, coronary artery disease status post percutaneous coronary intervention to the left anterior descending artery in 2016, hyperlipidemia, and atrial septal defect status post repair presented to the emergency department (ED) after experiencing syncope. She recounted feeling dizzy and subsequently losing consciousness, with no reported head trauma. Her family brought her to the ED, where she denied experiencing any chest pain or palpitations prior to losing consciousness. Additionally, she mentioned episodes of squeezing chest pressure with radiation to the neck, which limited her physical activity. These episodes occurred both at rest and with exertion, but the pain was not reproducible. She denied other symptoms such as fever, chills, abdominal pain, nausea, vomiting, diarrhea, constipation, dysuria, hematuria, lower extremity swelling, or rash.    Review of systems revealed positive findings as per the history of present illness, while negative for fever, chills, nausea, vomiting, chest pain, abdominal pain, shortness of breath, cough, constipation, diarrhea, and incontinence.    Over the last 24 hours in the ED, her vital signs were recorded as follows: temperature ranging from 36.2°C to a maximum of 36.6°C, heart rate between 67 and 81 beats per minute, blood pressure measuring 133/68 mmHg, respiratory rate ranging from 16 to 18 breaths per minute, and oxygen saturation consistently at 100% on room air.    Laboratory results showed a significant rise in troponin levels from 6 to 17 ng/mL before eventually decreasing to below 6 ng/mL. The electrocardiogram displayed no signs of ST-segment elevation but showed T-wave inversions. Chest X-ray revealed low lung volumes, while a computed tomography of the head returned negative results.    In the ED, the patient received aspirin, Plavix, amlodipine, and intravenous fluids totaling 1 liter. Subsequent to undergoing percutaneous coronary intervention of the left circumflex artery for unstable angina, she exhibited clinical improvement. However, her low-density lipoprotein cholesterol remained elevated at 157 mg/dL, indicating uncontrolled hyperlipidemia.    The recommended course of action included continuing aspirin and Plavix therapy, increasing the dose of atorvastatin to 80 mg (as she had been on a low dose at home) with plans for repeat lipid panel assessment in 6-8 weeks, scheduling a two-dimensional echocardiogram as an outpatient, beta-blocker therapy, and discharging the patient home on the same day with a follow-up appointment scheduled in 2 weeks with Dr. Nieto.    Pt is stable for d/c.   Central African speaking: Translation service used ID#[id number]  Pt is a 68-year-old female with a history of HTN, HLD, CAD s/p PCI LAD in 2016, ASD s/p repair, came to ED for  syncope. She recounted feeling dizzy and subsequently losing consciousness, with no reported head trauma. Her family brought her to the ED, where she denied experiencing any chest pain or palpitations prior to losing consciousness. Additionally, she mentioned episodes of squeezing chest pressure with radiation to the neck, which limited her physical activity. These episodes occurred both at rest and with exertion, but the pain was not reproducible. In the ED vital sign were stable, Troponin increased from 6 to 17, EKG showed no acute ischemic changes, Cardiology evaluated the patient, she went for cardiac cath, s/p PCI for OM1 2 WERO placed.     The recommended course of action included continuing aspirin and Plavix therapy, increasing the dose of atorvastatin to 80 mg (as she had been on a low dose at home) with plans for repeat lipid panel assessment in 6-8 weeks, scheduling a two-dimensional echocardiogram as an outpatient, beta-blocker therapy, and discharging the patient home on the same day with a follow-up appointment scheduled in 2 weeks with Dr. Nieto.    A/P:   Syncope:   Chest pain due to unstable Angina:   Syncopal episode, also Chest pain, typical.   EKG showed NST changes  Troponin 7>17>6  s/p Cardiac cath PCI for OM1, 2 WERO placed  Continue ASA, Plavix, Metoprolol, Increase Lipitor to 80mg daily.   Cardiology follow up outpatient.   Pt is stable for d/c.

## 2024-04-26 ENCOUNTER — RESULT CHARGE (OUTPATIENT)
Age: 69
End: 2024-04-26

## 2024-04-26 ENCOUNTER — NON-APPOINTMENT (OUTPATIENT)
Age: 69
End: 2024-04-26

## 2024-04-26 ENCOUNTER — APPOINTMENT (OUTPATIENT)
Dept: CARDIOLOGY | Facility: CLINIC | Age: 69
End: 2024-04-26
Payer: SELF-PAY

## 2024-04-26 VITALS
HEIGHT: 64 IN | OXYGEN SATURATION: 94 % | BODY MASS INDEX: 24.24 KG/M2 | HEART RATE: 80 BPM | DIASTOLIC BLOOD PRESSURE: 70 MMHG | RESPIRATION RATE: 18 BRPM | WEIGHT: 142 LBS | SYSTOLIC BLOOD PRESSURE: 100 MMHG

## 2024-04-26 DIAGNOSIS — Z98.61 ATHEROSCLEROTIC HEART DISEASE OF NATIVE CORONARY ARTERY W/OUT ANGINA PECTORIS: ICD-10-CM

## 2024-04-26 DIAGNOSIS — I25.10 ATHEROSCLEROTIC HEART DISEASE OF NATIVE CORONARY ARTERY W/OUT ANGINA PECTORIS: ICD-10-CM

## 2024-04-26 DIAGNOSIS — Q21.10 ATRIAL SEPTAL DEFECT, UNSPECIFIED: ICD-10-CM

## 2024-04-26 PROCEDURE — 99214 OFFICE O/P EST MOD 30 MIN: CPT

## 2024-04-26 RX ORDER — CLOPIDOGREL 75 MG/1
75 TABLET, FILM COATED ORAL
Refills: 0 | Status: DISCONTINUED | COMMUNITY
End: 2024-04-26

## 2024-04-26 RX ORDER — OMEPRAZOLE 40 MG/1
40 CAPSULE, DELAYED RELEASE ORAL
Refills: 0 | Status: DISCONTINUED | COMMUNITY
End: 2024-04-26

## 2024-04-26 RX ORDER — ASPIRIN 81 MG
81 TABLET, DELAYED RELEASE (ENTERIC COATED) ORAL DAILY
Refills: 0 | Status: ACTIVE | COMMUNITY

## 2024-04-26 RX ORDER — ATORVASTATIN CALCIUM 80 MG/1
80 TABLET, FILM COATED ORAL DAILY
Refills: 0 | Status: ACTIVE | COMMUNITY

## 2024-04-26 RX ORDER — METOPROLOL TARTRATE 25 MG/1
25 TABLET, FILM COATED ORAL
Refills: 0 | Status: DISCONTINUED | COMMUNITY
End: 2024-04-26

## 2024-04-26 RX ORDER — SIMVASTATIN 20 MG/1
20 TABLET, FILM COATED ORAL
Refills: 0 | Status: DISCONTINUED | COMMUNITY
End: 2024-04-26

## 2024-04-26 RX ORDER — ASPIRIN 81 MG
81 TABLET, DELAYED RELEASE (ENTERIC COATED) ORAL
Refills: 0 | Status: DISCONTINUED | COMMUNITY
End: 2024-04-26

## 2024-04-26 RX ORDER — CLOPIDOGREL BISULFATE 75 MG/1
75 TABLET, FILM COATED ORAL DAILY
Refills: 0 | Status: ACTIVE | COMMUNITY

## 2024-04-26 RX ORDER — METOPROLOL TARTRATE 25 MG/1
25 TABLET, FILM COATED ORAL TWICE DAILY
Refills: 0 | Status: ACTIVE | COMMUNITY

## 2024-04-26 RX ORDER — AMLODIPINE BESYLATE 2.5 MG/1
2.5 TABLET ORAL
Refills: 0 | Status: ACTIVE | COMMUNITY

## 2024-04-26 NOTE — CHART NOTE - NSCHARTNOTEFT_GEN_A_CORE
Patient presented with unstable angina without myocardial infarction. S/p PCI of LCX/OM for unstable angina.

## 2024-04-26 NOTE — ASSESSMENT
[FreeTextEntry1] : 67 y/o female with history as above.  ASD S/p closure 2D echo in the office  DL C/w Crestor 40 mg Will try to get Repatha approved again, as she had good control with PSCK-9 inhibitor in the past. C/w BB, ASA, Plavix D/c amlodipine  Cath reviewed.  F/u 2 months.

## 2024-04-26 NOTE — HISTORY OF PRESENT ILLNESS
[FreeTextEntry1] : 67 y/o female with history of CAD (s/p PCI) HTN, DL (familial hyperlipidemia) - untreated LDL over 200's, previously on Repatha, but lost insurance and now on Crestor 40 mg with LDL of 157. She has a history of ASD with RV dilatation, s/p closure with Amplatzer device in [facility] with Dr. Ellison in 2016. Was in [facility] with unstable angina. S/p PCI of LCX/OM with good result. Presents for f/u. No CP. + fatigue. BP is borderline.

## 2024-04-29 DIAGNOSIS — I25.110 ATHEROSCLEROTIC HEART DISEASE OF NATIVE CORONARY ARTERY WITH UNSTABLE ANGINA PECTORIS: ICD-10-CM

## 2024-04-29 DIAGNOSIS — E78.5 HYPERLIPIDEMIA, UNSPECIFIED: ICD-10-CM

## 2024-04-29 DIAGNOSIS — Z87.11 PERSONAL HISTORY OF PEPTIC ULCER DISEASE: ICD-10-CM

## 2024-04-29 DIAGNOSIS — R55 SYNCOPE AND COLLAPSE: ICD-10-CM

## 2024-04-29 DIAGNOSIS — Z79.82 LONG TERM (CURRENT) USE OF ASPIRIN: ICD-10-CM

## 2024-04-29 DIAGNOSIS — Z79.02 LONG TERM (CURRENT) USE OF ANTITHROMBOTICS/ANTIPLATELETS: ICD-10-CM

## 2024-04-29 DIAGNOSIS — Z95.5 PRESENCE OF CORONARY ANGIOPLASTY IMPLANT AND GRAFT: ICD-10-CM

## 2024-04-29 DIAGNOSIS — I10 ESSENTIAL (PRIMARY) HYPERTENSION: ICD-10-CM

## 2025-07-31 NOTE — ED CDU PROVIDER INITIAL DAY NOTE - NSTIMEPROVIDERCAREINITIATE_GEN_ER
Attempted to contact pt in regards to labs, medication and recheck iron in 3 months. Left voicemail to return call to clinic.    17-Apr-2024 15:43

## 2025-09-03 ENCOUNTER — NON-APPOINTMENT (OUTPATIENT)
Age: 70
End: 2025-09-03

## 2025-09-03 ENCOUNTER — APPOINTMENT (OUTPATIENT)
Dept: CARDIOLOGY | Facility: CLINIC | Age: 70
End: 2025-09-03
Payer: MEDICAID

## 2025-09-03 VITALS
BODY MASS INDEX: 24.59 KG/M2 | DIASTOLIC BLOOD PRESSURE: 80 MMHG | OXYGEN SATURATION: 96 % | SYSTOLIC BLOOD PRESSURE: 130 MMHG | TEMPERATURE: 97 F | RESPIRATION RATE: 16 BRPM | HEIGHT: 64 IN | HEART RATE: 78 BPM | WEIGHT: 144 LBS

## 2025-09-03 DIAGNOSIS — I26.99 OTHER PULMONARY EMBOLISM W/OUT ACUTE COR PULMONALE: ICD-10-CM

## 2025-09-03 DIAGNOSIS — Q21.10 ATRIAL SEPTAL DEFECT, UNSPECIFIED: ICD-10-CM

## 2025-09-03 DIAGNOSIS — I25.10 ATHEROSCLEROTIC HEART DISEASE OF NATIVE CORONARY ARTERY W/OUT ANGINA PECTORIS: ICD-10-CM

## 2025-09-03 DIAGNOSIS — Z98.61 ATHEROSCLEROTIC HEART DISEASE OF NATIVE CORONARY ARTERY W/OUT ANGINA PECTORIS: ICD-10-CM

## 2025-09-03 PROCEDURE — G2211 COMPLEX E/M VISIT ADD ON: CPT | Mod: NC

## 2025-09-03 PROCEDURE — 99214 OFFICE O/P EST MOD 30 MIN: CPT

## 2025-09-03 PROCEDURE — 93000 ELECTROCARDIOGRAM COMPLETE: CPT

## 2025-09-03 RX ORDER — BISOPROLOL FUMARATE 5 MG/1
5 TABLET, FILM COATED ORAL DAILY
Qty: 90 | Refills: 1 | Status: ACTIVE | COMMUNITY
Start: 2025-09-03 | End: 1900-01-01

## 2025-09-03 RX ORDER — EVOLOCUMAB 140 MG/ML
140 INJECTION, SOLUTION SUBCUTANEOUS
Qty: 1 | Refills: 2 | Status: ACTIVE | COMMUNITY
Start: 2025-09-03 | End: 1900-01-01

## 2025-09-03 RX ORDER — APIXABAN 5 MG/1
5 TABLET, FILM COATED ORAL
Qty: 180 | Refills: 2 | Status: ACTIVE | COMMUNITY
Start: 1900-01-01 | End: 1900-01-01

## 2025-09-03 RX ORDER — ROSUVASTATIN CALCIUM 40 MG/1
40 TABLET, FILM COATED ORAL
Refills: 0 | Status: ACTIVE | COMMUNITY

## 2025-09-07 VITALS
OXYGEN SATURATION: 99 % | HEART RATE: 53 BPM | DIASTOLIC BLOOD PRESSURE: 65 MMHG | WEIGHT: 141.1 LBS | HEIGHT: 62.99 IN | RESPIRATION RATE: 16 BRPM | SYSTOLIC BLOOD PRESSURE: 130 MMHG

## 2025-09-11 ENCOUNTER — TRANSCRIPTION ENCOUNTER (OUTPATIENT)
Age: 70
End: 2025-09-11

## 2025-09-11 ENCOUNTER — OUTPATIENT (OUTPATIENT)
Dept: OUTPATIENT SERVICES | Facility: HOSPITAL | Age: 70
LOS: 1 days | Discharge: HOME | End: 2025-09-11
Payer: MEDICAID

## 2025-09-11 VITALS
HEART RATE: 50 BPM | RESPIRATION RATE: 14 BRPM | OXYGEN SATURATION: 99 % | DIASTOLIC BLOOD PRESSURE: 64 MMHG | SYSTOLIC BLOOD PRESSURE: 120 MMHG

## 2025-09-11 DIAGNOSIS — I26.99 OTHER PULMONARY EMBOLISM WITHOUT ACUTE COR PULMONALE: ICD-10-CM

## 2025-09-11 DIAGNOSIS — Z87.74 PERSONAL HISTORY OF (CORRECTED) CONGENITAL MALFORMATIONS OF HEART AND CIRCULATORY SYSTEM: Chronic | ICD-10-CM

## 2025-09-11 DIAGNOSIS — I80.229 PHLEBITIS AND THROMBOPHLEBITIS OF UNSPECIFIED POPLITEAL VEIN: ICD-10-CM

## 2025-09-11 DIAGNOSIS — I25.10 ATHEROSCLEROTIC HEART DISEASE OF NATIVE CORONARY ARTERY WITHOUT ANGINA PECTORIS: ICD-10-CM

## 2025-09-11 DIAGNOSIS — Q21.10 ATRIAL SEPTAL DEFECT, UNSPECIFIED: ICD-10-CM

## 2025-09-11 DIAGNOSIS — Z98.890 OTHER SPECIFIED POSTPROCEDURAL STATES: Chronic | ICD-10-CM

## 2025-09-11 LAB
ANION GAP SERPL CALC-SCNC: 11 MMOL/L — SIGNIFICANT CHANGE UP (ref 7–14)
BUN SERPL-MCNC: 13 MG/DL — SIGNIFICANT CHANGE UP (ref 10–20)
CALCIUM SERPL-MCNC: 9 MG/DL — SIGNIFICANT CHANGE UP (ref 8.4–10.5)
CHLORIDE SERPL-SCNC: 105 MMOL/L — SIGNIFICANT CHANGE UP (ref 98–110)
CO2 SERPL-SCNC: 25 MMOL/L — SIGNIFICANT CHANGE UP (ref 17–32)
CREAT SERPL-MCNC: 1 MG/DL — SIGNIFICANT CHANGE UP (ref 0.7–1.5)
EGFR: 61 ML/MIN/1.73M2 — SIGNIFICANT CHANGE UP
EGFR: 61 ML/MIN/1.73M2 — SIGNIFICANT CHANGE UP
GLUCOSE SERPL-MCNC: 96 MG/DL — SIGNIFICANT CHANGE UP (ref 70–99)
HCT VFR BLD CALC: 41.4 % — SIGNIFICANT CHANGE UP (ref 37–47)
HGB BLD-MCNC: 14.1 G/DL — SIGNIFICANT CHANGE UP (ref 12–16)
MCHC RBC-ENTMCNC: 31.1 PG — HIGH (ref 27–31)
MCHC RBC-ENTMCNC: 34.1 G/DL — SIGNIFICANT CHANGE UP (ref 32–37)
MCV RBC AUTO: 91.2 FL — SIGNIFICANT CHANGE UP (ref 81–99)
NRBC BLD AUTO-RTO: 0 /100 WBCS — SIGNIFICANT CHANGE UP (ref 0–0)
PLATELET # BLD AUTO: 249 K/UL — SIGNIFICANT CHANGE UP (ref 130–400)
PMV BLD: 10.5 FL — HIGH (ref 7.4–10.4)
POTASSIUM SERPL-MCNC: 3.6 MMOL/L — SIGNIFICANT CHANGE UP (ref 3.5–5)
POTASSIUM SERPL-SCNC: 3.6 MMOL/L — SIGNIFICANT CHANGE UP (ref 3.5–5)
RBC # BLD: 4.54 M/UL — SIGNIFICANT CHANGE UP (ref 4.2–5.4)
RBC # FLD: 13.2 % — SIGNIFICANT CHANGE UP (ref 11.5–14.5)
SODIUM SERPL-SCNC: 141 MMOL/L — SIGNIFICANT CHANGE UP (ref 135–146)
WBC # BLD: 7.69 K/UL — SIGNIFICANT CHANGE UP (ref 4.8–10.8)
WBC # FLD AUTO: 7.69 K/UL — SIGNIFICANT CHANGE UP (ref 4.8–10.8)

## 2025-09-11 PROCEDURE — 93458 L HRT ARTERY/VENTRICLE ANGIO: CPT

## 2025-09-11 PROCEDURE — C1769: CPT

## 2025-09-11 PROCEDURE — 80048 BASIC METABOLIC PNL TOTAL CA: CPT

## 2025-09-11 PROCEDURE — 85027 COMPLETE CBC AUTOMATED: CPT

## 2025-09-11 PROCEDURE — C1894: CPT

## 2025-09-11 PROCEDURE — 93458 L HRT ARTERY/VENTRICLE ANGIO: CPT | Mod: 26

## 2025-09-11 PROCEDURE — C1887: CPT

## 2025-09-11 PROCEDURE — 36415 COLL VENOUS BLD VENIPUNCTURE: CPT

## 2025-09-11 RX ORDER — ROSUVASTATIN CALCIUM 20 MG/1
1 TABLET, FILM COATED ORAL
Refills: 0 | DISCHARGE

## 2025-09-11 RX ORDER — APIXABAN 2.5 MG/1
1 TABLET, FILM COATED ORAL
Refills: 0 | DISCHARGE

## 2025-09-11 RX ORDER — EZETIMIBE 10 MG/1
1 TABLET ORAL
Refills: 0 | DISCHARGE

## 2025-09-11 RX ORDER — ASPIRIN 325 MG
324 TABLET ORAL ONCE
Refills: 0 | Status: COMPLETED | OUTPATIENT
Start: 2025-09-11 | End: 2025-09-11

## 2025-09-11 RX ORDER — AMLODIPINE BESYLATE 10 MG/1
1 TABLET ORAL
Refills: 0 | DISCHARGE

## 2025-09-11 RX ADMIN — Medication 500 MILLILITER(S): at 08:03

## 2025-09-11 RX ADMIN — Medication 150 MILLILITER(S): at 09:48

## 2025-09-11 RX ADMIN — Medication 75 MILLILITER(S): at 08:03

## 2025-09-11 RX ADMIN — Medication 324 MILLIGRAM(S): at 08:03

## 2025-09-12 DIAGNOSIS — I25.118 ATHEROSCLEROTIC HEART DISEASE OF NATIVE CORONARY ARTERY WITH OTHER FORMS OF ANGINA PECTORIS: ICD-10-CM

## 2025-09-12 PROBLEM — I82.409 ACUTE EMBOLISM AND THROMBOSIS OF UNSPECIFIED DEEP VEINS OF UNSPECIFIED LOWER EXTREMITY: Chronic | Status: ACTIVE | Noted: 2025-09-11

## 2025-09-12 PROBLEM — I25.10 ATHEROSCLEROTIC HEART DISEASE OF NATIVE CORONARY ARTERY WITHOUT ANGINA PECTORIS: Chronic | Status: ACTIVE | Noted: 2025-09-11

## 2025-09-12 PROBLEM — I26.99 OTHER PULMONARY EMBOLISM WITHOUT ACUTE COR PULMONALE: Chronic | Status: ACTIVE | Noted: 2025-09-11

## 2025-09-17 ENCOUNTER — NON-APPOINTMENT (OUTPATIENT)
Age: 70
End: 2025-09-17

## 2025-09-17 ENCOUNTER — APPOINTMENT (OUTPATIENT)
Dept: CARDIOLOGY | Facility: CLINIC | Age: 70
End: 2025-09-17
Payer: MEDICAID

## 2025-09-17 VITALS
HEART RATE: 69 BPM | WEIGHT: 144 LBS | SYSTOLIC BLOOD PRESSURE: 120 MMHG | DIASTOLIC BLOOD PRESSURE: 80 MMHG | HEIGHT: 64 IN | BODY MASS INDEX: 24.59 KG/M2 | OXYGEN SATURATION: 95 % | RESPIRATION RATE: 16 BRPM

## 2025-09-17 DIAGNOSIS — Q21.10 ATRIAL SEPTAL DEFECT, UNSPECIFIED: ICD-10-CM

## 2025-09-17 DIAGNOSIS — I25.10 ATHEROSCLEROTIC HEART DISEASE OF NATIVE CORONARY ARTERY W/OUT ANGINA PECTORIS: ICD-10-CM

## 2025-09-17 DIAGNOSIS — Z98.61 ATHEROSCLEROTIC HEART DISEASE OF NATIVE CORONARY ARTERY W/OUT ANGINA PECTORIS: ICD-10-CM

## 2025-09-17 DIAGNOSIS — I26.99 OTHER PULMONARY EMBOLISM W/OUT ACUTE COR PULMONALE: ICD-10-CM

## 2025-09-17 PROCEDURE — 99214 OFFICE O/P EST MOD 30 MIN: CPT

## 2025-09-17 PROCEDURE — G2211 COMPLEX E/M VISIT ADD ON: CPT | Mod: NC

## 2025-09-17 PROCEDURE — 93000 ELECTROCARDIOGRAM COMPLETE: CPT

## 2025-09-17 RX ORDER — CLOPIDOGREL BISULFATE 75 MG/1
75 TABLET, FILM COATED ORAL DAILY
Qty: 1 | Refills: 2 | Status: ACTIVE | COMMUNITY
Start: 2025-09-17 | End: 1900-01-01

## 2025-09-17 RX ORDER — RANOLAZINE 500 MG/1
500 TABLET, FILM COATED, EXTENDED RELEASE ORAL
Qty: 180 | Refills: 3 | Status: ACTIVE | COMMUNITY
Start: 2025-09-17 | End: 1900-01-01

## 2025-09-17 RX ORDER — ESOMEPRAZOLE MAGNESIUM 40 MG/1
40 CAPSULE, DELAYED RELEASE ORAL DAILY
Qty: 90 | Refills: 1 | Status: ACTIVE | COMMUNITY
Start: 2025-09-17 | End: 1900-01-01